# Patient Record
Sex: FEMALE | Race: WHITE | NOT HISPANIC OR LATINO
[De-identification: names, ages, dates, MRNs, and addresses within clinical notes are randomized per-mention and may not be internally consistent; named-entity substitution may affect disease eponyms.]

---

## 2018-02-26 ENCOUNTER — RX RENEWAL (OUTPATIENT)
Age: 68
End: 2018-02-26

## 2018-03-02 ENCOUNTER — RX RENEWAL (OUTPATIENT)
Age: 68
End: 2018-03-02

## 2018-05-07 ENCOUNTER — RECORD ABSTRACTING (OUTPATIENT)
Age: 68
End: 2018-05-07

## 2018-05-07 DIAGNOSIS — Z83.71 FAMILY HISTORY OF COLONIC POLYPS: ICD-10-CM

## 2018-05-07 DIAGNOSIS — Z83.511 FAMILY HISTORY OF GLAUCOMA: ICD-10-CM

## 2018-05-07 DIAGNOSIS — Z80.0 FAMILY HISTORY OF MALIGNANT NEOPLASM OF DIGESTIVE ORGANS: ICD-10-CM

## 2018-05-07 DIAGNOSIS — Z82.49 FAMILY HISTORY OF ISCHEMIC HEART DISEASE AND OTHER DISEASES OF THE CIRCULATORY SYSTEM: ICD-10-CM

## 2018-05-07 DIAGNOSIS — Z87.891 PERSONAL HISTORY OF NICOTINE DEPENDENCE: ICD-10-CM

## 2018-05-10 ENCOUNTER — APPOINTMENT (OUTPATIENT)
Dept: FAMILY MEDICINE | Facility: CLINIC | Age: 68
End: 2018-05-10
Payer: MEDICARE

## 2018-05-10 VITALS
DIASTOLIC BLOOD PRESSURE: 84 MMHG | OXYGEN SATURATION: 97 % | BODY MASS INDEX: 30 KG/M2 | HEIGHT: 62 IN | WEIGHT: 163 LBS | SYSTOLIC BLOOD PRESSURE: 132 MMHG | HEART RATE: 86 BPM | RESPIRATION RATE: 14 BRPM

## 2018-05-10 PROCEDURE — G0402 INITIAL PREVENTIVE EXAM: CPT

## 2018-05-10 PROCEDURE — G0438: CPT

## 2018-05-11 ENCOUNTER — RX RENEWAL (OUTPATIENT)
Age: 68
End: 2018-05-11

## 2018-05-23 ENCOUNTER — APPOINTMENT (OUTPATIENT)
Dept: FAMILY MEDICINE | Facility: CLINIC | Age: 68
End: 2018-05-23
Payer: MEDICARE

## 2018-05-23 PROCEDURE — 36415 COLL VENOUS BLD VENIPUNCTURE: CPT

## 2018-05-24 ENCOUNTER — RESULT REVIEW (OUTPATIENT)
Age: 68
End: 2018-05-24

## 2018-05-24 LAB
ALBUMIN SERPL ELPH-MCNC: 4.3 G/DL
ALP BLD-CCNC: 53 U/L
ALT SERPL-CCNC: 18 U/L
ANION GAP SERPL CALC-SCNC: 13 MMOL/L
AST SERPL-CCNC: 21 U/L
BASOPHILS # BLD AUTO: 0.05 K/UL
BASOPHILS NFR BLD AUTO: 0.8 %
BILIRUB SERPL-MCNC: 0.5 MG/DL
BUN SERPL-MCNC: 20 MG/DL
CALCIUM SERPL-MCNC: 9.8 MG/DL
CHLORIDE SERPL-SCNC: 107 MMOL/L
CHOLEST SERPL-MCNC: 201 MG/DL
CHOLEST/HDLC SERPL: 3.2 RATIO
CO2 SERPL-SCNC: 26 MMOL/L
CREAT SERPL-MCNC: 0.87 MG/DL
EOSINOPHIL # BLD AUTO: 0.16 K/UL
EOSINOPHIL NFR BLD AUTO: 2.6 %
GLUCOSE SERPL-MCNC: 104 MG/DL
HCT VFR BLD CALC: 43.3 %
HDLC SERPL-MCNC: 63 MG/DL
HGB BLD-MCNC: 13.5 G/DL
IMM GRANULOCYTES NFR BLD AUTO: 0.3 %
LDLC SERPL CALC-MCNC: 106 MG/DL
LYMPHOCYTES # BLD AUTO: 2.16 K/UL
LYMPHOCYTES NFR BLD AUTO: 34.6 %
MAN DIFF?: NORMAL
MCHC RBC-ENTMCNC: 29.3 PG
MCHC RBC-ENTMCNC: 31.2 GM/DL
MCV RBC AUTO: 93.9 FL
MONOCYTES # BLD AUTO: 0.45 K/UL
MONOCYTES NFR BLD AUTO: 7.2 %
NEUTROPHILS # BLD AUTO: 3.4 K/UL
NEUTROPHILS NFR BLD AUTO: 54.5 %
PLATELET # BLD AUTO: 174 K/UL
POTASSIUM SERPL-SCNC: 4.7 MMOL/L
PROT SERPL-MCNC: 7.4 G/DL
RBC # BLD: 4.61 M/UL
RBC # FLD: 14.6 %
SODIUM SERPL-SCNC: 146 MMOL/L
TRIGL SERPL-MCNC: 161 MG/DL
TSH SERPL-ACNC: 4.08 UIU/ML
WBC # FLD AUTO: 6.24 K/UL

## 2018-05-26 ENCOUNTER — TRANSCRIPTION ENCOUNTER (OUTPATIENT)
Age: 68
End: 2018-05-26

## 2018-07-27 ENCOUNTER — RESULT REVIEW (OUTPATIENT)
Age: 68
End: 2018-07-27

## 2018-08-23 ENCOUNTER — APPOINTMENT (OUTPATIENT)
Dept: FAMILY MEDICINE | Facility: CLINIC | Age: 68
End: 2018-08-23
Payer: MEDICARE

## 2018-08-23 VITALS
BODY MASS INDEX: 30.18 KG/M2 | HEART RATE: 67 BPM | OXYGEN SATURATION: 97 % | RESPIRATION RATE: 12 BRPM | SYSTOLIC BLOOD PRESSURE: 122 MMHG | WEIGHT: 165 LBS | DIASTOLIC BLOOD PRESSURE: 80 MMHG

## 2018-08-23 DIAGNOSIS — Z87.898 PERSONAL HISTORY OF OTHER SPECIFIED CONDITIONS: ICD-10-CM

## 2018-08-23 PROCEDURE — G0439: CPT

## 2018-08-23 PROCEDURE — G0101: CPT

## 2018-08-23 NOTE — ASSESSMENT
[FreeTextEntry1] : PAP sent, she will be call with results as soon as they are available and follow up next year for a wellness visit or sooner prn

## 2018-08-23 NOTE — PHYSICAL EXAM
[No Acute Distress] : no acute distress [Well Nourished] : well nourished [Well Developed] : well developed [Well-Appearing] : well-appearing [Normal Voice/Communication] : normal voice/communication [Normal Appearance] : normal in appearance [No Masses] : no palpable masses [No Nipple Discharge] : no nipple discharge [Urethral Meatus] : normal urethra [Urinary Bladder Findings] : the bladder was normal on palpation [External Female Genitalia] : normal external genitalia [Vagina] : normal vaginal exam [Cervix] : normal cervix [Uterus] : uterus was normal size, without masses or tenderness [Uterine Adnexae] : normal adnexa [Speech Grossly Normal] : speech grossly normal [Memory Grossly Normal] : memory grossly normal [Normal Mood] : the mood was normal [Normal Insight/Judgement] : insight and judgment were intact

## 2018-08-27 LAB — CYTOLOGY CVX/VAG DOC THIN PREP: NORMAL

## 2019-01-31 ENCOUNTER — RX RENEWAL (OUTPATIENT)
Age: 69
End: 2019-01-31

## 2019-05-13 ENCOUNTER — TRANSCRIPTION ENCOUNTER (OUTPATIENT)
Age: 69
End: 2019-05-13

## 2019-05-13 ENCOUNTER — APPOINTMENT (OUTPATIENT)
Dept: FAMILY MEDICINE | Facility: CLINIC | Age: 69
End: 2019-05-13
Payer: MEDICARE

## 2019-05-13 VITALS
BODY MASS INDEX: 30.36 KG/M2 | HEIGHT: 62 IN | OXYGEN SATURATION: 97 % | WEIGHT: 165 LBS | SYSTOLIC BLOOD PRESSURE: 120 MMHG | HEART RATE: 80 BPM | DIASTOLIC BLOOD PRESSURE: 80 MMHG | RESPIRATION RATE: 14 BRPM

## 2019-05-13 DIAGNOSIS — E53.8 DEFICIENCY OF OTHER SPECIFIED B GROUP VITAMINS: ICD-10-CM

## 2019-05-13 DIAGNOSIS — Z86.39 PERSONAL HISTORY OF OTHER ENDOCRINE, NUTRITIONAL AND METABOLIC DISEASE: ICD-10-CM

## 2019-05-13 DIAGNOSIS — M85.80 OTHER SPECIFIED DISORDERS OF BONE DENSITY AND STRUCTURE, UNSPECIFIED SITE: ICD-10-CM

## 2019-05-13 DIAGNOSIS — Z12.31 ENCOUNTER FOR SCREENING MAMMOGRAM FOR MALIGNANT NEOPLASM OF BREAST: ICD-10-CM

## 2019-05-13 DIAGNOSIS — M81.0 AGE-RELATED OSTEOPOROSIS W/OUT CURRENT PATHOLOGICAL FRACTURE: ICD-10-CM

## 2019-05-13 DIAGNOSIS — E55.9 VITAMIN D DEFICIENCY, UNSPECIFIED: ICD-10-CM

## 2019-05-13 DIAGNOSIS — Z87.76 PERSONAL HISTORY OF (CORRECTED) CONGENITAL MALFORMATIONS OF INTEGUMENT, LIMBS AND MUSCULOSKELETAL SYSTEM: ICD-10-CM

## 2019-05-13 PROCEDURE — 36415 COLL VENOUS BLD VENIPUNCTURE: CPT

## 2019-05-13 PROCEDURE — G0439: CPT

## 2019-05-13 NOTE — PHYSICAL EXAM
[No Acute Distress] : no acute distress [Well Developed] : well developed [Well Nourished] : well nourished [Normal Sclera/Conjunctiva] : normal sclera/conjunctiva [Well-Appearing] : well-appearing [EOMI] : extraocular movements intact [Normal Outer Ear/Nose] : the outer ears and nose were normal in appearance [PERRL] : pupils equal round and reactive to light [Normal Oropharynx] : the oropharynx was normal [Supple] : supple [Thyroid Normal, No Nodules] : the thyroid was normal and there were no nodules present [No Lymphadenopathy] : no lymphadenopathy [No Respiratory Distress] : no respiratory distress  [Normal Rate] : normal rate  [Clear to Auscultation] : lungs were clear to auscultation bilaterally [No Accessory Muscle Use] : no accessory muscle use [Regular Rhythm] : with a regular rhythm [Normal S1, S2] : normal S1 and S2 [No Murmur] : no murmur heard [No Carotid Bruits] : no carotid bruits [No Abdominal Bruit] : a ~M bruit was not heard ~T in the abdomen [No Varicosities] : no varicosities [No Extremity Clubbing/Cyanosis] : no extremity clubbing/cyanosis [Pedal Pulses Present] : the pedal pulses are present [No Edema] : there was no peripheral edema [No Palpable Aorta] : no palpable aorta [Soft] : abdomen soft [Non Tender] : non-tender [No Masses] : no abdominal mass palpated [Non-distended] : non-distended [No HSM] : no HSM [Normal Bowel Sounds] : normal bowel sounds [Normal Posterior Cervical Nodes] : no posterior cervical lymphadenopathy [No CVA Tenderness] : no CVA  tenderness [Normal Anterior Cervical Nodes] : no anterior cervical lymphadenopathy [No Spinal Tenderness] : no spinal tenderness [No Rash] : no rash [Grossly Normal Strength/Tone] : grossly normal strength/tone [No Joint Swelling] : no joint swelling [Coordination Grossly Intact] : coordination grossly intact [Normal Gait] : normal gait [No Focal Deficits] : no focal deficits [Speech Grossly Normal] : speech grossly normal [Normal Affect] : the affect was normal [Memory Grossly Normal] : memory grossly normal [Normal Mood] : the mood was normal [Alert and Oriented x3] : oriented to person, place, and time [Normal Insight/Judgement] : insight and judgment were intact [Normal TMs] : both tympanic membranes were normal [Normal Nasal Mucosa] : the nasal mucosa was normal

## 2019-05-13 NOTE — PAST MEDICAL HISTORY
[Postmenopausal] : postmenopausal [Menarche Age ____] : age at menarche was [unfilled] [Menopause Age____] : age at menopause was [unfilled] [Total Preg ___] : G[unfilled] [Live Births ___] : P[unfilled]  [Full Term ___] : Full Term: [unfilled]

## 2019-05-13 NOTE — HEALTH RISK ASSESSMENT
[Excellent] : ~his/her~  mood as  excellent [No falls in past year] : Patient reported no falls in the past year [None] : None [Retired] : retired [Alone] : lives alone [College] : College [Significant Other] : lives with significant other [Sexually Active] : sexually active [Fully functional (bathing, dressing, toileting, transferring, walking, feeding)] : Fully functional (bathing, dressing, toileting, transferring, walking, feeding) [Feels Safe at Home] : Feels safe at home [Fully functional (using the telephone, shopping, preparing meals, housekeeping, doing laundry, using] : Fully functional and needs no help or supervision to perform IADLs (using the telephone, shopping, preparing meals, housekeeping, doing laundry, using transportation, managing medications and managing finances) [Reports normal functional visual acuity (ie: able to read med bottle)] : Reports normal functional visual acuity [Smoke Detector] : smoke detector [Carbon Monoxide Detector] : carbon monoxide detector [Safety elements used in home] : safety elements used in home [Seat Belt] :  uses seat belt [Sunscreen] : uses sunscreen [Patient/Caregiver not ready to engage] : Patient/Caregiver not ready to engage [HIV test declined] : HIV test declined [Hepatitis C test declined] : Hepatitis C test declined [] : No [de-identified] : none  [de-identified] : none  [de-identified] : age 15-early forties 1ppd  [YearQuit] : 1989 [de-identified] : drink wine with dinner one-two  [de-identified] : [facility] fitness class twice weekly [de-identified] : regular diet   [Change in mental status noted] : No change in mental status noted [Language] : denies difficulty with language [Learning/Retaining New Information] : denies difficulty learning/retaining new information [Behavior] : denies difficulty with behavior [Handling Complex Tasks] : denies difficulty handling complex tasks [Reasoning] : denies difficulty with reasoning [Spatial Ability and Orientation] : denies difficulty with spatial ability and orientation [Reports changes in hearing] : Reports no changes in hearing [Reports changes in vision] : Reports no changes in vision [Reports changes in dental health] : Reports no changes in dental health [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

## 2019-05-13 NOTE — HISTORY OF PRESENT ILLNESS
[FreeTextEntry1] : Pt presents for medicare wellness [de-identified] : Patient reports today for medicare wellness. Patient reports feeling well today, no acute complaints. Denies chest pain, shortness of breath, palpitations, headaches, edema, dizziness, bowel or bladder changes.\par

## 2019-05-13 NOTE — ASSESSMENT
[FreeTextEntry1] : Check labs drawn in office today for above assessed conditions. Labs to be resulted at the [facility] core lab. \par Script given for upcoming mammo, dexa in july \par Vitals and exam stable.

## 2019-05-13 NOTE — COUNSELING
[Weight management counseling provided] : Weight management [Healthy eating counseling provided] : healthy eating [Activity counseling provided] : activity [Low Fat Diet] : Low fat diet

## 2019-05-15 LAB
25(OH)D3 SERPL-MCNC: 31.1 NG/ML
ALBUMIN SERPL ELPH-MCNC: 4.4 G/DL
ALP BLD-CCNC: 64 U/L
ALT SERPL-CCNC: 13 U/L
ANION GAP SERPL CALC-SCNC: 13 MMOL/L
AST SERPL-CCNC: 13 U/L
BASOPHILS # BLD AUTO: 0.05 K/UL
BASOPHILS NFR BLD AUTO: 0.8 %
BILIRUB SERPL-MCNC: 0.4 MG/DL
BUN SERPL-MCNC: 19 MG/DL
CALCIUM SERPL-MCNC: 9.7 MG/DL
CHLORIDE SERPL-SCNC: 106 MMOL/L
CHOLEST SERPL-MCNC: 189 MG/DL
CHOLEST/HDLC SERPL: 3.1 RATIO
CO2 SERPL-SCNC: 26 MMOL/L
CREAT SERPL-MCNC: 0.76 MG/DL
EOSINOPHIL # BLD AUTO: 0.12 K/UL
EOSINOPHIL NFR BLD AUTO: 1.8 %
GLUCOSE SERPL-MCNC: 100 MG/DL
HCT VFR BLD CALC: 44.1 %
HDLC SERPL-MCNC: 62 MG/DL
HGB BLD-MCNC: 13.7 G/DL
IMM GRANULOCYTES NFR BLD AUTO: 0.3 %
LDLC SERPL CALC-MCNC: 97 MG/DL
LYMPHOCYTES # BLD AUTO: 1.83 K/UL
LYMPHOCYTES NFR BLD AUTO: 27.7 %
MAN DIFF?: NORMAL
MCHC RBC-ENTMCNC: 29.7 PG
MCHC RBC-ENTMCNC: 31.1 GM/DL
MCV RBC AUTO: 95.7 FL
MONOCYTES # BLD AUTO: 0.51 K/UL
MONOCYTES NFR BLD AUTO: 7.7 %
NEUTROPHILS # BLD AUTO: 4.07 K/UL
NEUTROPHILS NFR BLD AUTO: 61.7 %
PLATELET # BLD AUTO: 123 K/UL
POTASSIUM SERPL-SCNC: 4.3 MMOL/L
PROT SERPL-MCNC: 7.5 G/DL
RBC # BLD: 4.61 M/UL
RBC # FLD: 13.8 %
SODIUM SERPL-SCNC: 144 MMOL/L
TRIGL SERPL-MCNC: 152 MG/DL
VIT B12 SERPL-MCNC: 377 PG/ML
WBC # FLD AUTO: 6.6 K/UL

## 2020-01-24 ENCOUNTER — RX RENEWAL (OUTPATIENT)
Age: 70
End: 2020-01-24

## 2020-05-18 ENCOUNTER — LABORATORY RESULT (OUTPATIENT)
Age: 70
End: 2020-05-18

## 2020-05-18 ENCOUNTER — APPOINTMENT (OUTPATIENT)
Dept: FAMILY MEDICINE | Facility: CLINIC | Age: 70
End: 2020-05-18
Payer: MEDICARE

## 2020-05-18 VITALS
SYSTOLIC BLOOD PRESSURE: 120 MMHG | WEIGHT: 165 LBS | OXYGEN SATURATION: 97 % | HEART RATE: 73 BPM | BODY MASS INDEX: 30.36 KG/M2 | HEIGHT: 62 IN | RESPIRATION RATE: 14 BRPM | DIASTOLIC BLOOD PRESSURE: 76 MMHG

## 2020-05-18 DIAGNOSIS — R92.2 INCONCLUSIVE MAMMOGRAM: ICD-10-CM

## 2020-05-18 PROCEDURE — G0439: CPT

## 2020-05-18 PROCEDURE — 36415 COLL VENOUS BLD VENIPUNCTURE: CPT

## 2020-05-18 NOTE — HEALTH RISK ASSESSMENT
[Very Good] : ~his/her~ current health as very good [Good] : ~his/her~  mood as  good [Intercurrent Urgi Care visits] : went to urgent care [No falls in past year] : Patient reported no falls in the past year [HIV test declined] : HIV test declined [Hepatitis C test declined] : Hepatitis C test declined [None] : None [College] : College [Retired] : retired [Alone] : lives alone [Fully functional (bathing, dressing, toileting, transferring, walking, feeding)] : Fully functional (bathing, dressing, toileting, transferring, walking, feeding) [Feels Safe at Home] : Feels safe at home [Reports changes in hearing] : Reports changes in hearing [Fully functional (using the telephone, shopping, preparing meals, housekeeping, doing laundry, using] : Fully functional and needs no help or supervision to perform IADLs (using the telephone, shopping, preparing meals, housekeeping, doing laundry, using transportation, managing medications and managing finances) [Reports normal functional visual acuity (ie: able to read med bottle)] : Reports normal functional visual acuity [Smoke Detector] : smoke detector [Carbon Monoxide Detector] : carbon monoxide detector [Seat Belt] :  uses seat belt [Safety elements used in home] : safety elements used in home [Sunscreen] : uses sunscreen [Patient/Caregiver not ready to engage] : Patient/Caregiver not ready to engage [FreeTextEntry1] : gets dehydrated with exercise [] : No [de-identified] : went for antibody testing (neg) [de-identified] : Dr Christina (Derm) [YearQuit] : 1990 [de-identified] : see SBIRT [de-identified] : exercises regularly [de-identified] : regular [Change in mental status noted] : No change in mental status noted [Behavior] : denies difficulty with behavior [Language] : denies difficulty with language [Learning/Retaining New Information] : denies difficulty learning/retaining new information [Reasoning] : denies difficulty with reasoning [Handling Complex Tasks] : denies difficulty handling complex tasks [Spatial Ability and Orientation] : denies difficulty with spatial ability and orientation [Sexually Active] : not sexually active [Reports changes in vision] : Reports no changes in vision [Reports changes in dental health] : Reports no changes in dental health [Guns at Home] : no guns at home [Travel to Developing Areas] : does not  travel to developing areas [TB Exposure] : is not being exposed to tuberculosis [Caregiver Concerns] : does not have caregiver concerns [MammogramDate] : 08/19 [PapSmearDate] : 08/18 [BoneDensityDate] : 08/19 [ColonoscopyDate] : 05/18 [de-identified] : found to have hearing loss [AdvancecareDate] : 05/20

## 2020-05-18 NOTE — PLAN
[FreeTextEntry1] : she declined receiving pneumovax, she was advised to stay well hydrated, labs will be drawn in the office today to further assess her health and symptoms, she will follow up in august for a PAP and pelvic exam and was given a rx for a screening mammogram and breast ultrasound

## 2020-05-18 NOTE — HISTORY OF PRESENT ILLNESS
[de-identified] : She is overall doing well, she has been walking for exercise and sweating and when she gets home her urine is very dark, if she drinks water it returns to a normal color

## 2020-05-18 NOTE — PHYSICAL EXAM
[No Acute Distress] : no acute distress [Well Nourished] : well nourished [Well-Appearing] : well-appearing [Well Developed] : well developed [Normal Outer Ear/Nose] : the outer ears and nose were normal in appearance [Normal Voice/Communication] : normal voice/communication [Normal Sclera/Conjunctiva] : normal sclera/conjunctiva [Normal Oropharynx] : the oropharynx was normal [Normal TMs] : both tympanic membranes were normal [No Respiratory Distress] : no respiratory distress  [Supple] : supple [No Lymphadenopathy] : no lymphadenopathy [Clear to Auscultation] : lungs were clear to auscultation bilaterally [No Accessory Muscle Use] : no accessory muscle use [Normal Rate] : normal rate  [Regular Rhythm] : with a regular rhythm [Normal S1, S2] : normal S1 and S2 [No Carotid Bruits] : no carotid bruits [Soft] : abdomen soft [No Edema] : there was no peripheral edema [Non-distended] : non-distended [Non Tender] : non-tender [Normal Bowel Sounds] : normal bowel sounds [No HSM] : no HSM [No Rash] : no rash [Coordination Grossly Intact] : coordination grossly intact [Speech Grossly Normal] : speech grossly normal [No Focal Deficits] : no focal deficits [Normal Affect] : the affect was normal [Memory Grossly Normal] : memory grossly normal [Normal Insight/Judgement] : insight and judgment were intact [Normal Mood] : the mood was normal

## 2020-05-19 DIAGNOSIS — E03.9 HYPOTHYROIDISM, UNSPECIFIED: ICD-10-CM

## 2020-05-19 LAB
25(OH)D3 SERPL-MCNC: 41.2 NG/ML
ALBUMIN SERPL ELPH-MCNC: 4.4 G/DL
ALP BLD-CCNC: 56 U/L
ALT SERPL-CCNC: 17 U/L
ANION GAP SERPL CALC-SCNC: 13 MMOL/L
AST SERPL-CCNC: 16 U/L
BASOPHILS # BLD AUTO: 0.05 K/UL
BASOPHILS NFR BLD AUTO: 0.8 %
BILIRUB SERPL-MCNC: 0.4 MG/DL
BUN SERPL-MCNC: 21 MG/DL
CALCIUM SERPL-MCNC: 9.3 MG/DL
CHLORIDE SERPL-SCNC: 106 MMOL/L
CHOLEST SERPL-MCNC: 197 MG/DL
CHOLEST/HDLC SERPL: 3 RATIO
CO2 SERPL-SCNC: 24 MMOL/L
CREAT SERPL-MCNC: 0.86 MG/DL
EOSINOPHIL # BLD AUTO: 0.13 K/UL
EOSINOPHIL NFR BLD AUTO: 2.2 %
FOLATE SERPL-MCNC: 8.1 NG/ML
GLUCOSE SERPL-MCNC: 116 MG/DL
HCT VFR BLD CALC: 42.9 %
HDLC SERPL-MCNC: 65 MG/DL
HGB BLD-MCNC: 13.1 G/DL
IMM GRANULOCYTES NFR BLD AUTO: 0.3 %
LDLC SERPL CALC-MCNC: 96 MG/DL
LYMPHOCYTES # BLD AUTO: 1.68 K/UL
LYMPHOCYTES NFR BLD AUTO: 28 %
MAN DIFF?: NORMAL
MCHC RBC-ENTMCNC: 29.4 PG
MCHC RBC-ENTMCNC: 30.5 GM/DL
MCV RBC AUTO: 96.2 FL
MONOCYTES # BLD AUTO: 0.45 K/UL
MONOCYTES NFR BLD AUTO: 7.5 %
NEUTROPHILS # BLD AUTO: 3.67 K/UL
NEUTROPHILS NFR BLD AUTO: 61.2 %
PLATELET # BLD AUTO: 147 K/UL
POTASSIUM SERPL-SCNC: 4.4 MMOL/L
PROT SERPL-MCNC: 7.1 G/DL
RBC # BLD: 4.46 M/UL
RBC # FLD: 14.2 %
SODIUM SERPL-SCNC: 143 MMOL/L
TRIGL SERPL-MCNC: 180 MG/DL
TSH SERPL-ACNC: 5.92 UIU/ML
VIT B12 SERPL-MCNC: 645 PG/ML
WBC # FLD AUTO: 6 K/UL

## 2020-05-22 ENCOUNTER — TRANSCRIPTION ENCOUNTER (OUTPATIENT)
Age: 70
End: 2020-05-22

## 2020-05-22 ENCOUNTER — APPOINTMENT (OUTPATIENT)
Dept: FAMILY MEDICINE | Facility: CLINIC | Age: 70
End: 2020-05-22
Payer: MEDICARE

## 2020-05-22 PROCEDURE — 99441: CPT | Mod: 95

## 2020-08-24 ENCOUNTER — APPOINTMENT (OUTPATIENT)
Dept: FAMILY MEDICINE | Facility: CLINIC | Age: 70
End: 2020-08-24
Payer: MEDICARE

## 2020-08-24 ENCOUNTER — LABORATORY RESULT (OUTPATIENT)
Age: 70
End: 2020-08-24

## 2020-08-24 VITALS — TEMPERATURE: 98.5 F

## 2020-08-24 VITALS
HEART RATE: 75 BPM | SYSTOLIC BLOOD PRESSURE: 140 MMHG | OXYGEN SATURATION: 96 % | WEIGHT: 170 LBS | DIASTOLIC BLOOD PRESSURE: 70 MMHG | BODY MASS INDEX: 31.28 KG/M2 | HEIGHT: 62 IN | RESPIRATION RATE: 15 BRPM

## 2020-08-24 VITALS — SYSTOLIC BLOOD PRESSURE: 136 MMHG | DIASTOLIC BLOOD PRESSURE: 74 MMHG

## 2020-08-24 DIAGNOSIS — Z01.419 ENCOUNTER FOR GYNECOLOGICAL EXAMINATION (GENERAL) (ROUTINE) W/OUT ABNORMAL FINDINGS: ICD-10-CM

## 2020-08-24 DIAGNOSIS — Z00.00 ENCOUNTER FOR GENERAL ADULT MEDICAL EXAMINATION W/OUT ABNORMAL FINDINGS: ICD-10-CM

## 2020-08-24 PROCEDURE — 99213 OFFICE O/P EST LOW 20 MIN: CPT | Mod: 25

## 2020-08-24 PROCEDURE — G0439: CPT

## 2020-08-24 PROCEDURE — G0101: CPT

## 2020-08-24 PROCEDURE — 36415 COLL VENOUS BLD VENIPUNCTURE: CPT

## 2020-08-24 NOTE — PLAN
[FreeTextEntry1] : PAP was obtained today, she was advised to follow a low sodium diet and to check her BP periodically at the pharmacy and to follow up if it is elevated, labs will be drawn in the office today to follow up on the hypothyroidism and to be screened for Hepatitis C

## 2020-08-24 NOTE — HISTORY OF PRESENT ILLNESS
[FreeTextEntry1] : pt. presents for PAP  [de-identified] : Her LMP was at age 50.  She is scheduled for the mammogram and breast ultrasound.  She ate salty potato chips last night

## 2020-08-24 NOTE — PHYSICAL EXAM
[No Acute Distress] : no acute distress [Well Developed] : well developed [Well Nourished] : well nourished [Normal Voice/Communication] : normal voice/communication [Well-Appearing] : well-appearing [No Masses] : no palpable masses [Normal Appearance] : normal in appearance [No Nipple Discharge] : no nipple discharge [Urethral Meatus] : normal urethra [Urinary Bladder Findings] : the bladder was normal on palpation [Vagina] : normal vaginal exam [External Female Genitalia] : normal external genitalia [Cervix] : normal cervix [Uterus] : uterus was normal size, without masses or tenderness [Coordination Grossly Intact] : coordination grossly intact [Anus Abnormality] : the anus and perineum were normal [Uterine Adnexae] : normal adnexa [Speech Grossly Normal] : speech grossly normal [Memory Grossly Normal] : memory grossly normal [Normal Mood] : the mood was normal [Normal Insight/Judgement] : insight and judgment were intact

## 2020-08-25 LAB
HCV AB SER QL: NONREACTIVE
HCV S/CO RATIO: 0.1 S/CO
T3 SERPL-MCNC: 132 NG/DL
T3FREE SERPL-MCNC: 3 PG/ML
T3RU NFR SERPL: 1.3 TBI
T4 FREE SERPL-MCNC: 1.1 NG/DL
T4 SERPL-MCNC: 7.8 UG/DL
TSH SERPL-ACNC: 3.47 UIU/ML

## 2020-08-28 LAB — CYTOLOGY CVX/VAG DOC THIN PREP: NORMAL

## 2020-10-22 PROBLEM — Z00.00 MEDICARE ANNUAL WELLNESS VISIT, SUBSEQUENT: Status: RESOLVED | Noted: 2019-05-13 | Resolved: 2020-10-22

## 2020-12-23 PROBLEM — Z01.419 ENCOUNTER FOR GYNECOLOGICAL EXAMINATION WITHOUT ABNORMAL FINDING: Status: RESOLVED | Noted: 2018-08-23 | Resolved: 2020-12-23

## 2021-01-21 ENCOUNTER — RX RENEWAL (OUTPATIENT)
Age: 71
End: 2021-01-21

## 2021-02-23 NOTE — HISTORY OF PRESENT ILLNESS
[FreeTextEntry1] : pt presents for pap exam [de-identified] : Her LMP was at age 49.  She denies any history of abnormal vaginal bleeding or cramping. hard copy, drawn during this pregnancy

## 2021-05-12 ENCOUNTER — NON-APPOINTMENT (OUTPATIENT)
Age: 71
End: 2021-05-12

## 2021-05-24 ENCOUNTER — NON-APPOINTMENT (OUTPATIENT)
Age: 71
End: 2021-05-24

## 2021-05-24 ENCOUNTER — APPOINTMENT (OUTPATIENT)
Dept: FAMILY MEDICINE | Facility: CLINIC | Age: 71
End: 2021-05-24
Payer: MEDICARE

## 2021-05-24 ENCOUNTER — RESULT REVIEW (OUTPATIENT)
Age: 71
End: 2021-05-24

## 2021-05-24 VITALS
RESPIRATION RATE: 14 BRPM | HEIGHT: 62 IN | BODY MASS INDEX: 30.36 KG/M2 | HEART RATE: 68 BPM | SYSTOLIC BLOOD PRESSURE: 126 MMHG | DIASTOLIC BLOOD PRESSURE: 82 MMHG | WEIGHT: 165 LBS | OXYGEN SATURATION: 98 %

## 2021-05-24 VITALS — TEMPERATURE: 98.2 F

## 2021-05-24 DIAGNOSIS — Z87.898 PERSONAL HISTORY OF OTHER SPECIFIED CONDITIONS: ICD-10-CM

## 2021-05-24 DIAGNOSIS — Z79.899 OTHER LONG TERM (CURRENT) DRUG THERAPY: ICD-10-CM

## 2021-05-24 DIAGNOSIS — R69 ILLNESS, UNSPECIFIED: ICD-10-CM

## 2021-05-24 DIAGNOSIS — Z11.59 ENCOUNTER FOR SCREENING FOR OTHER VIRAL DISEASES: ICD-10-CM

## 2021-05-24 DIAGNOSIS — L20.9 ATOPIC DERMATITIS, UNSPECIFIED: ICD-10-CM

## 2021-05-24 LAB
BILIRUB UR QL STRIP: NORMAL
CLARITY UR: NORMAL
COLLECTION METHOD: NORMAL
GLUCOSE UR-MCNC: NORMAL
HCG UR QL: 0.2 EU/DL
HGB UR QL STRIP.AUTO: NORMAL
KETONES UR-MCNC: NORMAL
LEUKOCYTE ESTERASE UR QL STRIP: NORMAL
NITRITE UR QL STRIP: NORMAL
PH UR STRIP: 5.5
PROT UR STRIP-MCNC: 300
SP GR UR STRIP: 1.03

## 2021-05-24 PROCEDURE — 81003 URINALYSIS AUTO W/O SCOPE: CPT | Mod: QW

## 2021-05-24 PROCEDURE — 99213 OFFICE O/P EST LOW 20 MIN: CPT | Mod: 25

## 2021-05-24 PROCEDURE — G0439: CPT

## 2021-05-24 PROCEDURE — 36415 COLL VENOUS BLD VENIPUNCTURE: CPT

## 2021-05-24 RX ORDER — TRIAMCINOLONE ACETONIDE 1 MG/G
0.1 OINTMENT TOPICAL
Qty: 454 | Refills: 0 | Status: DISCONTINUED | COMMUNITY
Start: 2021-03-01

## 2021-05-24 NOTE — HISTORY OF PRESENT ILLNESS
[de-identified] : She had lab testing for life insurance last week and the urine test showed blood and protein.  She notices that sometimes after exercise her urine looks dark.  During her mother's recent  she had pain in the left side of her abdomen.  She took Gas X and then vomited and the pain resolved.  She had seen blood in her urine 2 days prior.

## 2021-05-24 NOTE — COUNSELING
[AUDIT-C Screening administered and reviewed] : AUDIT-C Screening administered and reviewed [Hazards of at-risk alcohol use discussed] : Hazards of at-risk alcohol use discussed [FreeTextEntry2] : drinks wine with dinner, advised decreased use

## 2021-05-24 NOTE — PLAN
[FreeTextEntry1] : renal ultrasound and POCT U/A were ordered to further evaluate the abnormal urine findings and symptoms, labs will be drawn in the office today to further assess her health

## 2021-05-24 NOTE — REVIEW OF SYSTEMS
[Abdominal Pain] : abdominal pain [Nausea] : nausea [Vomiting] : vomiting [Hematuria] : hematuria [Negative] : Heme/Lymph

## 2021-05-24 NOTE — PHYSICAL EXAM
[No Acute Distress] : no acute distress [Well Nourished] : well nourished [Well Developed] : well developed [Well-Appearing] : well-appearing [Normal Voice/Communication] : normal voice/communication [Normal Sclera/Conjunctiva] : normal sclera/conjunctiva [Normal Outer Ear/Nose] : the outer ears and nose were normal in appearance [Normal TMs] : both tympanic membranes were normal [No Lymphadenopathy] : no lymphadenopathy [Supple] : supple [No Respiratory Distress] : no respiratory distress  [No Accessory Muscle Use] : no accessory muscle use [Clear to Auscultation] : lungs were clear to auscultation bilaterally [Normal Rate] : normal rate  [Regular Rhythm] : with a regular rhythm [Normal S1, S2] : normal S1 and S2 [No Murmur] : no murmur heard [No Carotid Bruits] : no carotid bruits [No Edema] : there was no peripheral edema [Soft] : abdomen soft [Non Tender] : non-tender [Non-distended] : non-distended [No CVA Tenderness] : no CVA  tenderness [Coordination Grossly Intact] : coordination grossly intact [Normal Mood] : the mood was normal

## 2021-05-24 NOTE — HEALTH RISK ASSESSMENT
[Very Good] : ~his/her~  mood as very good [No] : In the past 12 months have you used drugs other than those required for medical reasons? No [No falls in past year] : Patient reported no falls in the past year [HIV test declined] : HIV test declined [Hepatitis C test declined] : Hepatitis C test declined [None] : None [Alone] : lives alone [Feels Safe at Home] : Feels safe at home [Fully functional (bathing, dressing, toileting, transferring, walking, feeding)] : Fully functional (bathing, dressing, toileting, transferring, walking, feeding) [Fully functional (using the telephone, shopping, preparing meals, housekeeping, doing laundry, using] : Fully functional and needs no help or supervision to perform IADLs (using the telephone, shopping, preparing meals, housekeeping, doing laundry, using transportation, managing medications and managing finances) [Reports normal functional visual acuity (ie: able to read med bottle)] : Reports normal functional visual acuity [Smoke Detector] : smoke detector [Carbon Monoxide Detector] : carbon monoxide detector [Safety elements used in home] : safety elements used in home [Seat Belt] :  uses seat belt [Sunscreen] : uses sunscreen [Designated Healthcare Proxy] : Designated healthcare proxy [Name: ___] : Health Care Proxy's Name: [unfilled]  [Relationship: ___] : Relationship: [unfilled] [DNR] : DNR [DNI] : DNI [FreeTextEntry1] : see HPI [] : No [de-identified] : none [de-identified] : Dr Cummings (Derm) [YearQuit] : 1990 [de-identified] : see SBIRT [de-identified] : tries to exercise [de-identified] : regular [Change in mental status noted] : No change in mental status noted [Language] : denies difficulty with language [Behavior] : denies difficulty with behavior [Learning/Retaining New Information] : denies difficulty learning/retaining new information [Handling Complex Tasks] : denies difficulty handling complex tasks [Reasoning] : denies difficulty with reasoning [Spatial Ability and Orientation] : denies difficulty with spatial ability and orientation [Reports changes in hearing] : Reports no changes in hearing [Reports changes in vision] : Reports no changes in vision [Reports changes in dental health] : Reports no changes in dental health [Guns at Home] : no guns at home [Travel to Developing Areas] : does not  travel to developing areas [TB Exposure] : is not being exposed to tuberculosis [Caregiver Concerns] : does not have caregiver concerns [PapSmearDate] : 08/18 [MammogramDate] : 09/20 [BoneDensityDate] : 08/19 [ColonoscopyDate] : 05/18 [AdvancecareDate] : 05/21

## 2021-05-25 ENCOUNTER — OUTPATIENT (OUTPATIENT)
Dept: OUTPATIENT SERVICES | Facility: HOSPITAL | Age: 71
LOS: 1 days | End: 2021-05-25
Payer: MEDICARE

## 2021-05-25 ENCOUNTER — APPOINTMENT (OUTPATIENT)
Dept: ULTRASOUND IMAGING | Facility: CLINIC | Age: 71
End: 2021-05-25
Payer: MEDICARE

## 2021-05-25 DIAGNOSIS — R31.0 GROSS HEMATURIA: ICD-10-CM

## 2021-05-25 PROCEDURE — 76770 US EXAM ABDO BACK WALL COMP: CPT | Mod: 26

## 2021-05-25 PROCEDURE — 76770 US EXAM ABDO BACK WALL COMP: CPT

## 2021-05-26 ENCOUNTER — APPOINTMENT (OUTPATIENT)
Dept: UROLOGY | Facility: CLINIC | Age: 71
End: 2021-05-26
Payer: MEDICARE

## 2021-05-26 VITALS
DIASTOLIC BLOOD PRESSURE: 73 MMHG | WEIGHT: 165 LBS | HEART RATE: 70 BPM | HEIGHT: 62 IN | BODY MASS INDEX: 30.36 KG/M2 | SYSTOLIC BLOOD PRESSURE: 129 MMHG

## 2021-05-26 LAB
ALBUMIN SERPL ELPH-MCNC: 4.4 G/DL
ALP BLD-CCNC: 56 U/L
ALT SERPL-CCNC: 12 U/L
ANION GAP SERPL CALC-SCNC: 11 MMOL/L
AST SERPL-CCNC: 18 U/L
BASOPHILS # BLD AUTO: 0.06 K/UL
BASOPHILS NFR BLD AUTO: 0.8 %
BILIRUB SERPL-MCNC: 0.3 MG/DL
BUN SERPL-MCNC: 19 MG/DL
CALCIUM SERPL-MCNC: 9.7 MG/DL
CHLORIDE SERPL-SCNC: 107 MMOL/L
CHOLEST SERPL-MCNC: 194 MG/DL
CO2 SERPL-SCNC: 24 MMOL/L
CREAT SERPL-MCNC: 0.82 MG/DL
EOSINOPHIL # BLD AUTO: 0.16 K/UL
EOSINOPHIL NFR BLD AUTO: 2.1 %
GLUCOSE SERPL-MCNC: 111 MG/DL
HCT VFR BLD CALC: 43.7 %
HDLC SERPL-MCNC: 73 MG/DL
HGB BLD-MCNC: 13.3 G/DL
IMM GRANULOCYTES NFR BLD AUTO: 0.3 %
LDLC SERPL CALC-MCNC: 102 MG/DL
LYMPHOCYTES # BLD AUTO: 1.88 K/UL
LYMPHOCYTES NFR BLD AUTO: 24.9 %
MAN DIFF?: NORMAL
MCHC RBC-ENTMCNC: 29.9 PG
MCHC RBC-ENTMCNC: 30.4 GM/DL
MCV RBC AUTO: 98.2 FL
MONOCYTES # BLD AUTO: 0.48 K/UL
MONOCYTES NFR BLD AUTO: 6.3 %
NEUTROPHILS # BLD AUTO: 4.96 K/UL
NEUTROPHILS NFR BLD AUTO: 65.6 %
NONHDLC SERPL-MCNC: 121 MG/DL
PLATELET # BLD AUTO: 146 K/UL
POTASSIUM SERPL-SCNC: 4.4 MMOL/L
PROT SERPL-MCNC: 7.5 G/DL
RBC # BLD: 4.45 M/UL
RBC # FLD: 14.6 %
SODIUM SERPL-SCNC: 142 MMOL/L
TRIGL SERPL-MCNC: 96 MG/DL
TSH SERPL-ACNC: 3.02 UIU/ML
WBC # FLD AUTO: 7.56 K/UL

## 2021-05-26 PROCEDURE — 99204 OFFICE O/P NEW MOD 45 MIN: CPT

## 2021-05-26 NOTE — LETTER BODY
[Dear  ___] : Dear ~KATELYNN, [Consult Letter:] : I had the pleasure of evaluating your patient, [unfilled]. [( Thank you for referring [unfilled] for consultation for _____ )] : Thank you for referring [unfilled] for consultation for [unfilled] [Please see my note below.] : Please see my note below. [Consult Closing:] : Thank you very much for allowing me to participate in the care of this patient.  If you have any questions, please do not hesitate to contact me. [Sincerely,] : Sincerely,

## 2021-05-26 NOTE — HISTORY OF PRESENT ILLNESS
Ventricular Rate : 138   Atrial Rate : 138   QRS Duration : 124   Q-T Interval : 214   QTC Calculation(Bezet) : 324   R Axis : -59   T Axis : 32   Diagnosis : Atrial fibrillation with rapid ventricular response with premature ventricular or aberrantly conducted complexes~Left axis deviation~Right bundle branch block~Inferior infarct , age undetermined~Abnormal ECG~When compared with ECG of 18-AUG-2017 21:13, (unconfirmed)~Atrial fibrillation has replaced Electronic atrial pacemaker~Vent. rate has increased BY  78 BPM~Right bundle branch block is now Present~Inferior infarct is now Present~Confirmed by Chau Brady ([id number]) on 8/20/2017 10:42:33 AM      [FreeTextEntry1] : The patient is a 70-year-old woman who was seen in the office today for the above.  She has no other urological or constitutional symptomatology.  Her daytime frequency is 6 times a day without nocturia.\par \par Past Urological History: Negative\par \par Urological Family History:\par Father-nephrolithiasis

## 2021-05-26 NOTE — ASSESSMENT
[FreeTextEntry1] : 1.)  Microscopic hematuria\par 5/24/2021 BUN 19 creatinine 0.82\par Today's dipstick urinalysis demonstrated large blood and small leukocytes so was sent to laboratory for microscopic exam and culture.\par 3 more urinalyses will be obtained and if more than 3 RBCs are noted in 2 out of the 3 tests, a hematuria work-up will be pursued.\par \par RTO 2 weeks for reevaluation.\par

## 2021-05-26 NOTE — PHYSICAL EXAM
[General Appearance - Well Developed] : well developed [General Appearance - Well Nourished] : well nourished [Normal Appearance] : normal appearance [Well Groomed] : well groomed [General Appearance - In No Acute Distress] : no acute distress [Edema] : no peripheral edema [Respiration, Rhythm And Depth] : normal respiratory rhythm and effort [Exaggerated Use Of Accessory Muscles For Inspiration] : no accessory muscle use [Auscultation Breath Sounds / Voice Sounds] : lungs were clear to auscultation bilaterally [Bowel Sounds] : normal bowel sounds [Abdomen Soft] : soft [Abdomen Tenderness] : non-tender [Abdomen Mass (___ Cm)] : no abdominal mass palpated [Costovertebral Angle Tenderness] : no ~M costovertebral angle tenderness [Normal Station and Gait] : the gait and station were normal for the patient's age [] : no rash [No Focal Deficits] : no focal deficits [Oriented To Time, Place, And Person] : oriented to person, place, and time [Affect] : the affect was normal [Mood] : the mood was normal [Not Anxious] : not anxious [No Palpable Adenopathy] : no palpable adenopathy [FreeTextEntry1] : S1-S2 normal without murmur rub or gallop.

## 2021-05-26 NOTE — REVIEW OF SYSTEMS
[see HPI] : see HPI [Feeling Tired] : feeling tired [Constipation] : constipation [Told you have blood in urine on a urine test] : told blood was present in a urine test [Wake up at night to urinate  How many times?  ___] : wakes up to urinate [unfilled] times during the night [Leakage of urine with straining, coughing, laughing] : leakage of urine with straining, coughing, laughing [Negative] : Musculoskeletal

## 2021-05-28 LAB
APPEARANCE: ABNORMAL
BACTERIA: ABNORMAL
BILIRUB UR QL STRIP: NEGATIVE
BILIRUBIN URINE: NEGATIVE
BLOOD URINE: ABNORMAL
CALCIUM OXALATE CRYSTALS: ABNORMAL
CLARITY UR: CLEAR
COLLECTION METHOD: NORMAL
COLOR: ABNORMAL
GLUCOSE QUALITATIVE U: NEGATIVE
GLUCOSE UR-MCNC: NEGATIVE
HCG UR QL: 0.2 EU/DL
HGB UR QL STRIP.AUTO: ABNORMAL
HYALINE CASTS: 0 /LPF
KETONES UR-MCNC: NEGATIVE
KETONES URINE: NEGATIVE
LEUKOCYTE ESTERASE UR QL STRIP: ABNORMAL
LEUKOCYTE ESTERASE URINE: ABNORMAL
MICROSCOPIC-UA: NORMAL
NITRITE UR QL STRIP: NEGATIVE
NITRITE URINE: NEGATIVE
PH UR STRIP: 5.5
PH URINE: 6
PROT UR STRIP-MCNC: ABNORMAL
PROTEIN URINE: ABNORMAL
RED BLOOD CELLS URINE: >720 /HPF
SP GR UR STRIP: 1.02
SPECIFIC GRAVITY URINE: 1.02
SQUAMOUS EPITHELIAL CELLS: 5 /HPF
URIC ACID CRYSTALS: ABNORMAL
UROBILINOGEN URINE: NORMAL
WHITE BLOOD CELLS URINE: 12 /HPF

## 2021-05-30 LAB — BACTERIA UR CULT: NORMAL

## 2021-06-04 LAB
APPEARANCE: ABNORMAL
BACTERIA: NEGATIVE
BILIRUBIN URINE: NEGATIVE
BLOOD URINE: ABNORMAL
COLOR: ABNORMAL
GLUCOSE QUALITATIVE U: NEGATIVE
HYALINE CASTS: 0 /LPF
KETONES URINE: NEGATIVE
LEUKOCYTE ESTERASE URINE: ABNORMAL
MICROSCOPIC-UA: NORMAL
NITRITE URINE: NEGATIVE
PH URINE: 5.5
PROTEIN URINE: ABNORMAL
RED BLOOD CELLS URINE: >720 /HPF
SPECIFIC GRAVITY URINE: 1.02
SQUAMOUS EPITHELIAL CELLS: 2 /HPF
URIC ACID CRYSTALS: ABNORMAL
URINE COMMENTS: NORMAL
UROBILINOGEN URINE: NORMAL
WHITE BLOOD CELLS URINE: 34 /HPF

## 2021-06-05 LAB
APPEARANCE: ABNORMAL
BACTERIA: NEGATIVE
BILIRUBIN URINE: NEGATIVE
BLOOD URINE: ABNORMAL
COLOR: YELLOW
GLUCOSE QUALITATIVE U: NEGATIVE
HYALINE CASTS: 6 /LPF
KETONES URINE: NEGATIVE
LEUKOCYTE ESTERASE URINE: ABNORMAL
MICROSCOPIC-UA: NORMAL
NITRITE URINE: NEGATIVE
PH URINE: 6
PROTEIN URINE: ABNORMAL
RED BLOOD CELLS URINE: >720 /HPF
SPECIFIC GRAVITY URINE: 1.02
SQUAMOUS EPITHELIAL CELLS: 6 /HPF
UROBILINOGEN URINE: NORMAL
WHITE BLOOD CELLS URINE: 46 /HPF

## 2021-06-08 ENCOUNTER — OUTPATIENT (OUTPATIENT)
Dept: OUTPATIENT SERVICES | Facility: HOSPITAL | Age: 71
LOS: 1 days | End: 2021-06-08
Payer: MEDICARE

## 2021-06-08 ENCOUNTER — APPOINTMENT (OUTPATIENT)
Dept: MRI IMAGING | Facility: CLINIC | Age: 71
End: 2021-06-08
Payer: MEDICARE

## 2021-06-08 DIAGNOSIS — K76.9 LIVER DISEASE, UNSPECIFIED: ICD-10-CM

## 2021-06-08 LAB
APPEARANCE: ABNORMAL
BACTERIA: NEGATIVE
BILIRUBIN URINE: NEGATIVE
BLOOD URINE: ABNORMAL
COLOR: YELLOW
GLUCOSE QUALITATIVE U: NEGATIVE
HYALINE CASTS: 0 /LPF
KETONES URINE: NEGATIVE
LEUKOCYTE ESTERASE URINE: ABNORMAL
MICROSCOPIC-UA: NORMAL
NITRITE URINE: NEGATIVE
PH URINE: 6
PROTEIN URINE: ABNORMAL
RED BLOOD CELLS URINE: 136 /HPF
SPECIFIC GRAVITY URINE: >=1.03
SQUAMOUS EPITHELIAL CELLS: 1 /HPF
URINE COMMENTS: NORMAL
UROBILINOGEN URINE: NORMAL
WHITE BLOOD CELLS URINE: 22 /HPF

## 2021-06-08 PROCEDURE — A9585: CPT

## 2021-06-08 PROCEDURE — 74183 MRI ABD W/O CNTR FLWD CNTR: CPT | Mod: 26,ME

## 2021-06-08 PROCEDURE — 74183 MRI ABD W/O CNTR FLWD CNTR: CPT

## 2021-06-08 PROCEDURE — G1004: CPT

## 2021-06-09 ENCOUNTER — LABORATORY RESULT (OUTPATIENT)
Age: 71
End: 2021-06-09

## 2021-06-09 ENCOUNTER — APPOINTMENT (OUTPATIENT)
Dept: UROLOGY | Facility: CLINIC | Age: 71
End: 2021-06-09
Payer: MEDICARE

## 2021-06-09 VITALS — TEMPERATURE: 97.9 F

## 2021-06-09 PROCEDURE — 99214 OFFICE O/P EST MOD 30 MIN: CPT

## 2021-06-09 NOTE — HISTORY OF PRESENT ILLNESS
[FreeTextEntry1] : The patient is a 70-year-old woman who was seen in the office today for the above.  She submitted 3 more urinalyses to confirm the presence of blood.\par \par Her past medical history, surgical history, medication history and allergy history are unchanged.

## 2021-06-09 NOTE — LETTER BODY
[Dear  ___] : Dear ~KATELYNN, [Courtesy Letter:] : I had the pleasure of seeing your patient, [unfilled], in my office today. [Please see my note below.] : Please see my note below. [Consult Closing:] : Thank you very much for allowing me to participate in the care of this patient.  If you have any questions, please do not hesitate to contact me. [Sincerely,] : Sincerely,

## 2021-06-09 NOTE — ASSESSMENT
[FreeTextEntry1] : 1.)  Blood in the urine with pain in the left side of the abdomen\par All 4 microscopic urinalyses demonstrated significant microscopic hematuria and pyuria.  Since she did not have symptoms of a UTI at presentation during the last visit, the urine culture was not obtained.\par Urine culture will be sent today.\par A BUN, creatinine, CT urogram, urine cytology, and office cystoscopy will be ordered.

## 2021-06-10 ENCOUNTER — APPOINTMENT (OUTPATIENT)
Dept: CT IMAGING | Facility: CLINIC | Age: 71
End: 2021-06-10
Payer: MEDICARE

## 2021-06-10 ENCOUNTER — OUTPATIENT (OUTPATIENT)
Dept: OUTPATIENT SERVICES | Facility: HOSPITAL | Age: 71
LOS: 1 days | End: 2021-06-10
Payer: MEDICARE

## 2021-06-10 DIAGNOSIS — R31.0 GROSS HEMATURIA: ICD-10-CM

## 2021-06-10 LAB
APPEARANCE: ABNORMAL
BACTERIA: ABNORMAL
BILIRUBIN URINE: NEGATIVE
BLOOD URINE: ABNORMAL
BUN SERPL-MCNC: 19 MG/DL
COLOR: COLORLESS
CREAT SERPL-MCNC: 0.87 MG/DL
GLUCOSE QUALITATIVE U: NEGATIVE
HYALINE CASTS: 0 /LPF
KETONES URINE: NEGATIVE
LEUKOCYTE ESTERASE URINE: ABNORMAL
MICROSCOPIC-UA: NORMAL
NITRITE URINE: NEGATIVE
PH URINE: 6
PROTEIN URINE: ABNORMAL
RED BLOOD CELLS URINE: >720 /HPF
SPECIFIC GRAVITY URINE: 1.01
SQUAMOUS EPITHELIAL CELLS: 1 /HPF
UROBILINOGEN URINE: NORMAL
WHITE BLOOD CELLS URINE: 11 /HPF

## 2021-06-10 PROCEDURE — 74178 CT ABD&PLV WO CNTR FLWD CNTR: CPT | Mod: 26,MH

## 2021-06-10 PROCEDURE — 74178 CT ABD&PLV WO CNTR FLWD CNTR: CPT

## 2021-06-10 PROCEDURE — 82565 ASSAY OF CREATININE: CPT

## 2021-06-11 LAB — BACTERIA UR CULT: NORMAL

## 2021-06-14 LAB — URINE CYTOLOGY: NORMAL

## 2021-06-17 LAB — URINE CYTOLOGY: NORMAL

## 2021-06-30 ENCOUNTER — APPOINTMENT (OUTPATIENT)
Dept: UROLOGY | Facility: CLINIC | Age: 71
End: 2021-06-30
Payer: MEDICARE

## 2021-06-30 VITALS — DIASTOLIC BLOOD PRESSURE: 79 MMHG | HEART RATE: 84 BPM | TEMPERATURE: 98.1 F | SYSTOLIC BLOOD PRESSURE: 151 MMHG

## 2021-06-30 DIAGNOSIS — R31.29 OTHER MICROSCOPIC HEMATURIA: ICD-10-CM

## 2021-06-30 PROCEDURE — 52000 CYSTOURETHROSCOPY: CPT

## 2021-07-05 LAB — NIDUS STONE QN: NORMAL

## 2021-07-12 ENCOUNTER — APPOINTMENT (OUTPATIENT)
Dept: UROLOGY | Facility: CLINIC | Age: 71
End: 2021-07-12
Payer: MEDICARE

## 2021-07-12 VITALS — TEMPERATURE: 98.1 F

## 2021-07-12 PROCEDURE — 99213 OFFICE O/P EST LOW 20 MIN: CPT

## 2021-07-12 NOTE — PHYSICAL EXAM
[General Appearance - Well Developed] : well developed [General Appearance - Well Nourished] : well nourished [Normal Appearance] : normal appearance [Well Groomed] : well groomed [General Appearance - In No Acute Distress] : no acute distress [Bowel Sounds] : normal bowel sounds [Abdomen Soft] : soft [Abdomen Tenderness] : non-tender [Abdomen Mass (___ Cm)] : no abdominal mass palpated [Costovertebral Angle Tenderness] : no ~M costovertebral angle tenderness [Urinary Bladder Findings] : the bladder was normal on palpation [Edema] : no peripheral edema [] : no respiratory distress [Respiration, Rhythm And Depth] : normal respiratory rhythm and effort [Exaggerated Use Of Accessory Muscles For Inspiration] : no accessory muscle use [Oriented To Time, Place, And Person] : oriented to person, place, and time [Affect] : the affect was normal [Mood] : the mood was normal [Not Anxious] : not anxious [Normal Station and Gait] : the gait and station were normal for the patient's age [No Focal Deficits] : no focal deficits

## 2021-07-12 NOTE — HISTORY OF PRESENT ILLNESS
[FreeTextEntry1] : The patient is a 70-year-old woman previously seen by Dr Moore for gross hematuria. She was found to have large stone burden on CT, cluster of stones measuring 2.5 in LEFT renal pelvis plus additional 1 cm stone in lower pole. She is seen 7/12/2021 to discuss treatment options.

## 2021-07-12 NOTE — ASSESSMENT
[FreeTextEntry1] : 71 yo F with 2.5 cm renal stones on LEFT. Recommended PCNL. RBA discussed. Pt agrees.

## 2021-07-23 ENCOUNTER — OUTPATIENT (OUTPATIENT)
Dept: OUTPATIENT SERVICES | Facility: HOSPITAL | Age: 71
LOS: 1 days | End: 2021-07-23
Payer: MEDICARE

## 2021-07-23 ENCOUNTER — RESULT REVIEW (OUTPATIENT)
Age: 71
End: 2021-07-23

## 2021-07-23 VITALS
TEMPERATURE: 98 F | DIASTOLIC BLOOD PRESSURE: 66 MMHG | SYSTOLIC BLOOD PRESSURE: 140 MMHG | HEART RATE: 60 BPM | WEIGHT: 170.2 LBS | OXYGEN SATURATION: 98 % | HEIGHT: 62 IN

## 2021-07-23 DIAGNOSIS — Z01.818 ENCOUNTER FOR OTHER PREPROCEDURAL EXAMINATION: ICD-10-CM

## 2021-07-23 DIAGNOSIS — N20.0 CALCULUS OF KIDNEY: ICD-10-CM

## 2021-07-23 DIAGNOSIS — Z98.890 OTHER SPECIFIED POSTPROCEDURAL STATES: Chronic | ICD-10-CM

## 2021-07-23 LAB
ANION GAP SERPL CALC-SCNC: 4 MMOL/L — LOW (ref 5–17)
APPEARANCE UR: ABNORMAL
APTT BLD: 28.7 SEC — SIGNIFICANT CHANGE UP (ref 27.5–35.5)
BASOPHILS # BLD AUTO: 0.04 K/UL — SIGNIFICANT CHANGE UP (ref 0–0.2)
BASOPHILS NFR BLD AUTO: 0.5 % — SIGNIFICANT CHANGE UP (ref 0–2)
BILIRUB UR-MCNC: NEGATIVE — SIGNIFICANT CHANGE UP
BUN SERPL-MCNC: 21 MG/DL — SIGNIFICANT CHANGE UP (ref 7–23)
CALCIUM SERPL-MCNC: 9.4 MG/DL — SIGNIFICANT CHANGE UP (ref 8.5–10.1)
CHLORIDE SERPL-SCNC: 110 MMOL/L — HIGH (ref 96–108)
CO2 SERPL-SCNC: 27 MMOL/L — SIGNIFICANT CHANGE UP (ref 22–31)
COLOR SPEC: YELLOW — SIGNIFICANT CHANGE UP
CREAT SERPL-MCNC: 0.86 MG/DL — SIGNIFICANT CHANGE UP (ref 0.5–1.3)
DIFF PNL FLD: ABNORMAL
EOSINOPHIL # BLD AUTO: 0.16 K/UL — SIGNIFICANT CHANGE UP (ref 0–0.5)
EOSINOPHIL NFR BLD AUTO: 2.1 % — SIGNIFICANT CHANGE UP (ref 0–6)
GLUCOSE SERPL-MCNC: 97 MG/DL — SIGNIFICANT CHANGE UP (ref 70–99)
GLUCOSE UR QL: NEGATIVE MG/DL — SIGNIFICANT CHANGE UP
HCT VFR BLD CALC: 40.9 % — SIGNIFICANT CHANGE UP (ref 34.5–45)
HGB BLD-MCNC: 13.1 G/DL — SIGNIFICANT CHANGE UP (ref 11.5–15.5)
IMM GRANULOCYTES NFR BLD AUTO: 0.3 % — SIGNIFICANT CHANGE UP (ref 0–1.5)
INR BLD: 0.96 RATIO — SIGNIFICANT CHANGE UP (ref 0.88–1.16)
KETONES UR-MCNC: NEGATIVE — SIGNIFICANT CHANGE UP
LEUKOCYTE ESTERASE UR-ACNC: ABNORMAL
LYMPHOCYTES # BLD AUTO: 1.97 K/UL — SIGNIFICANT CHANGE UP (ref 1–3.3)
LYMPHOCYTES # BLD AUTO: 26.1 % — SIGNIFICANT CHANGE UP (ref 13–44)
MCHC RBC-ENTMCNC: 29.7 PG — SIGNIFICANT CHANGE UP (ref 27–34)
MCHC RBC-ENTMCNC: 32 GM/DL — SIGNIFICANT CHANGE UP (ref 32–36)
MCV RBC AUTO: 92.7 FL — SIGNIFICANT CHANGE UP (ref 80–100)
MONOCYTES # BLD AUTO: 0.63 K/UL — SIGNIFICANT CHANGE UP (ref 0–0.9)
MONOCYTES NFR BLD AUTO: 8.3 % — SIGNIFICANT CHANGE UP (ref 2–14)
NEUTROPHILS # BLD AUTO: 4.74 K/UL — SIGNIFICANT CHANGE UP (ref 1.8–7.4)
NEUTROPHILS NFR BLD AUTO: 62.7 % — SIGNIFICANT CHANGE UP (ref 43–77)
NITRITE UR-MCNC: NEGATIVE — SIGNIFICANT CHANGE UP
PH UR: 5 — SIGNIFICANT CHANGE UP (ref 5–8)
PLATELET # BLD AUTO: 226 K/UL — SIGNIFICANT CHANGE UP (ref 150–400)
POTASSIUM SERPL-MCNC: 3.9 MMOL/L — SIGNIFICANT CHANGE UP (ref 3.5–5.3)
POTASSIUM SERPL-SCNC: 3.9 MMOL/L — SIGNIFICANT CHANGE UP (ref 3.5–5.3)
PROT UR-MCNC: 100 MG/DL
PROTHROM AB SERPL-ACNC: 11.2 SEC — SIGNIFICANT CHANGE UP (ref 10.6–13.6)
RBC # BLD: 4.41 M/UL — SIGNIFICANT CHANGE UP (ref 3.8–5.2)
RBC # FLD: 14.3 % — SIGNIFICANT CHANGE UP (ref 10.3–14.5)
SODIUM SERPL-SCNC: 141 MMOL/L — SIGNIFICANT CHANGE UP (ref 135–145)
SP GR SPEC: 1.02 — SIGNIFICANT CHANGE UP (ref 1.01–1.02)
UROBILINOGEN FLD QL: NEGATIVE MG/DL — SIGNIFICANT CHANGE UP
WBC # BLD: 7.56 K/UL — SIGNIFICANT CHANGE UP (ref 3.8–10.5)
WBC # FLD AUTO: 7.56 K/UL — SIGNIFICANT CHANGE UP (ref 3.8–10.5)

## 2021-07-23 PROCEDURE — G0463: CPT | Mod: 25

## 2021-07-23 PROCEDURE — 93005 ELECTROCARDIOGRAM TRACING: CPT

## 2021-07-23 PROCEDURE — 87086 URINE CULTURE/COLONY COUNT: CPT

## 2021-07-23 PROCEDURE — 71046 X-RAY EXAM CHEST 2 VIEWS: CPT | Mod: 26

## 2021-07-23 PROCEDURE — 85025 COMPLETE CBC W/AUTO DIFF WBC: CPT

## 2021-07-23 PROCEDURE — 36415 COLL VENOUS BLD VENIPUNCTURE: CPT

## 2021-07-23 PROCEDURE — 86900 BLOOD TYPING SEROLOGIC ABO: CPT

## 2021-07-23 PROCEDURE — 81001 URINALYSIS AUTO W/SCOPE: CPT

## 2021-07-23 PROCEDURE — 86901 BLOOD TYPING SEROLOGIC RH(D): CPT

## 2021-07-23 PROCEDURE — 86850 RBC ANTIBODY SCREEN: CPT

## 2021-07-23 PROCEDURE — 93010 ELECTROCARDIOGRAM REPORT: CPT

## 2021-07-23 PROCEDURE — 85730 THROMBOPLASTIN TIME PARTIAL: CPT

## 2021-07-23 PROCEDURE — 80048 BASIC METABOLIC PNL TOTAL CA: CPT

## 2021-07-23 PROCEDURE — 71046 X-RAY EXAM CHEST 2 VIEWS: CPT

## 2021-07-23 PROCEDURE — 85610 PROTHROMBIN TIME: CPT

## 2021-07-23 NOTE — H&P PST ADULT - ASSESSMENT
71 year old woman presents to PST for preprocedure exam. Patient is for planned Left PCNL with Dr Hope on 8/2.        Plan:  - PST instructions given ; NPO status instructions to be given by ASU .  - Pt instructed to take following meds with sip of water : no meds  - Pt instructed to take routine evening medications unless indicated .  -  Stop NSAIDS ( Aspirin Alev Motrin Mobic Diclofenac), herbal supplements , MVI , Vitamin fish oil 7 days prior to surgery  unless   directed by surgeon or cardiologist;   - Medical Optimization  with Dr Guajardo  - EZ wash instructions given   - Labs EKG CXR as per surgeon request.   -  Pt instructed to self quarantine .  - Covid Testing scheduled

## 2021-07-23 NOTE — H&P PST ADULT - NSICDXPASTMEDICALHX_GEN_ALL_CORE_FT
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

## 2021-07-23 NOTE — H&P PST ADULT - HEALTH CARE MAINTENANCE
covid vaccine - pfizer- completed- Februray 2021       Denies travel outside of state or country in the last 14 days.   Denies contact with known Coronavirus positive person.  Denies fever, chills, cough, congestion, runny nose, SOB or difficulty breathing, fatigue, muscle or body ache, headache. loss of taste or smell, N/V/D.

## 2021-07-23 NOTE — H&P PST ADULT - HISTORY OF PRESENT ILLNESS
71 year old woman presents to [facility] for preprocedure exam. Patient is for planned Left PCNL with Dr Hope on 8/2. As per patient a urinalysis was done recently which showed bacteria and protein- she had no symptoms of UTI at that time. Further imaging showed 1.3 cm calculus in the left renal pole and multiple small calculi. Patient denies UTI, she states that she get intermittent flank pain- no constant. She does not take anything for pain. No blood in urine but she was told she had large microscopic blood in the UA.

## 2021-07-24 DIAGNOSIS — Z01.818 ENCOUNTER FOR OTHER PREPROCEDURAL EXAMINATION: ICD-10-CM

## 2021-07-24 DIAGNOSIS — N20.0 CALCULUS OF KIDNEY: ICD-10-CM

## 2021-07-24 LAB
CULTURE RESULTS: SIGNIFICANT CHANGE UP
SPECIMEN SOURCE: SIGNIFICANT CHANGE UP

## 2021-07-25 PROBLEM — N20.0 CALCULUS OF KIDNEY: Chronic | Status: ACTIVE | Noted: 2021-07-23

## 2021-07-29 ENCOUNTER — APPOINTMENT (OUTPATIENT)
Dept: FAMILY MEDICINE | Facility: CLINIC | Age: 71
End: 2021-07-29
Payer: MEDICARE

## 2021-07-29 VITALS
HEART RATE: 87 BPM | DIASTOLIC BLOOD PRESSURE: 78 MMHG | BODY MASS INDEX: 30.36 KG/M2 | WEIGHT: 165 LBS | SYSTOLIC BLOOD PRESSURE: 122 MMHG | RESPIRATION RATE: 14 BRPM | HEIGHT: 62 IN | OXYGEN SATURATION: 98 %

## 2021-07-29 VITALS — TEMPERATURE: 98.1 F

## 2021-07-29 DIAGNOSIS — Z01.818 ENCOUNTER FOR OTHER PREPROCEDURAL EXAMINATION: ICD-10-CM

## 2021-07-29 PROCEDURE — 99214 OFFICE O/P EST MOD 30 MIN: CPT

## 2021-07-29 RX ORDER — HYDROCORTISONE 1 %
12 CREAM (GRAM) TOPICAL TWICE DAILY
Qty: 1 | Refills: 3 | Status: DISCONTINUED | COMMUNITY
Start: 2018-05-10 | End: 2021-07-29

## 2021-07-29 RX ORDER — CALCIUM CARBONATE/VITAMIN D3 600 MG-20
600-125 TABLET ORAL
Refills: 0 | Status: DISCONTINUED | COMMUNITY
End: 2021-07-29

## 2021-07-29 NOTE — PHYSICAL EXAM
[No Acute Distress] : no acute distress [Well Nourished] : well nourished [Well Developed] : well developed [Well-Appearing] : well-appearing [Normal Voice/Communication] : normal voice/communication [Normal Sclera/Conjunctiva] : normal sclera/conjunctiva [Normal Outer Ear/Nose] : the outer ears and nose were normal in appearance [Normal Oropharynx] : the oropharynx was normal [Normal TMs] : both tympanic membranes were normal [No Lymphadenopathy] : no lymphadenopathy [Supple] : supple [No Respiratory Distress] : no respiratory distress  [No Accessory Muscle Use] : no accessory muscle use [Clear to Auscultation] : lungs were clear to auscultation bilaterally [Normal Rate] : normal rate  [Regular Rhythm] : with a regular rhythm [Normal S1, S2] : normal S1 and S2 [No Carotid Bruits] : no carotid bruits [No Edema] : there was no peripheral edema [Soft] : abdomen soft [Non Tender] : non-tender [Non-distended] : non-distended [Coordination Grossly Intact] : coordination grossly intact [Normal Mood] : the mood was normal [de-identified] : 1/6 systolic murmur

## 2021-07-29 NOTE — HISTORY OF PRESENT ILLNESS
[No Pertinent Cardiac History] : no history of aortic stenosis, atrial fibrillation, coronary artery disease, recent myocardial infarction, or implantable device/pacemaker [No Pertinent Pulmonary History] : no history of asthma, COPD, sleep apnea, or smoking [No Adverse Anesthesia Reaction] : no adverse anesthesia reaction in self or family member [Aortic Stenosis] : no aortic stenosis [Atrial Fibrillation] : no atrial fibrillation [Coronary Artery Disease] : no coronary artery disease [Recent Myocardial Infarction] : no recent myocardial infarction [Implantable Device/Pacemaker] : no implantable device/pacemaker [Asthma] : no asthma [COPD] : no COPD [Sleep Apnea] : no sleep apnea [Smoker] : not a smoker [Family Member] : no family member with adverse anesthesia reaction/sudden death [Self] : no previous adverse anesthesia reaction [Chronic Anticoagulation] : no chronic anticoagulation [Chronic Kidney Disease] : no chronic kidney disease [Diabetes] : no diabetes [(Patient denies any chest pain, claudication, dyspnea on exertion, orthopnea, palpitations or syncope)] : Patient denies any chest pain, claudication, dyspnea on exertion, orthopnea, palpitations or syncope [FreeTextEntry1] : PCNL [FreeTextEntry2] : 8/2/2021/[facility] [FreeTextEntry3] : Dr Mack Hope [FreeTextEntry4] : pt presents for medical clearance

## 2021-07-30 ENCOUNTER — APPOINTMENT (OUTPATIENT)
Dept: DISASTER EMERGENCY | Facility: CLINIC | Age: 71
End: 2021-07-30

## 2021-07-31 LAB — SARS-COV-2 N GENE NPH QL NAA+PROBE: NOT DETECTED

## 2021-08-02 ENCOUNTER — OUTPATIENT (OUTPATIENT)
Dept: INPATIENT UNIT | Facility: HOSPITAL | Age: 71
LOS: 1 days | Discharge: HOME | End: 2021-08-02
Payer: MEDICARE

## 2021-08-02 ENCOUNTER — RESULT REVIEW (OUTPATIENT)
Age: 71
End: 2021-08-02

## 2021-08-02 ENCOUNTER — APPOINTMENT (OUTPATIENT)
Dept: UROLOGY | Facility: HOSPITAL | Age: 71
End: 2021-08-02

## 2021-08-02 VITALS
HEIGHT: 62.5 IN | WEIGHT: 164.91 LBS | SYSTOLIC BLOOD PRESSURE: 144 MMHG | OXYGEN SATURATION: 99 % | HEART RATE: 70 BPM | DIASTOLIC BLOOD PRESSURE: 61 MMHG | TEMPERATURE: 97 F | RESPIRATION RATE: 16 BRPM

## 2021-08-02 DIAGNOSIS — M21.611 BUNION OF RIGHT FOOT: Chronic | ICD-10-CM

## 2021-08-02 DIAGNOSIS — N20.0 CALCULUS OF KIDNEY: ICD-10-CM

## 2021-08-02 DIAGNOSIS — Z98.890 OTHER SPECIFIED POSTPROCEDURAL STATES: Chronic | ICD-10-CM

## 2021-08-02 LAB
ANION GAP SERPL CALC-SCNC: 4 MMOL/L — LOW (ref 5–17)
BASOPHILS # BLD AUTO: 0.05 K/UL — SIGNIFICANT CHANGE UP (ref 0–0.2)
BASOPHILS NFR BLD AUTO: 0.3 % — SIGNIFICANT CHANGE UP (ref 0–2)
BUN SERPL-MCNC: 15 MG/DL — SIGNIFICANT CHANGE UP (ref 7–23)
CALCIUM SERPL-MCNC: 8.2 MG/DL — LOW (ref 8.5–10.1)
CHLORIDE SERPL-SCNC: 114 MMOL/L — HIGH (ref 96–108)
CO2 SERPL-SCNC: 26 MMOL/L — SIGNIFICANT CHANGE UP (ref 22–31)
CREAT SERPL-MCNC: 0.9 MG/DL — SIGNIFICANT CHANGE UP (ref 0.5–1.3)
EOSINOPHIL # BLD AUTO: 0 K/UL — SIGNIFICANT CHANGE UP (ref 0–0.5)
EOSINOPHIL NFR BLD AUTO: 0 % — SIGNIFICANT CHANGE UP (ref 0–6)
GLUCOSE SERPL-MCNC: 146 MG/DL — HIGH (ref 70–99)
HCT VFR BLD CALC: 39.1 % — SIGNIFICANT CHANGE UP (ref 34.5–45)
HGB BLD-MCNC: 12.4 G/DL — SIGNIFICANT CHANGE UP (ref 11.5–15.5)
IMM GRANULOCYTES NFR BLD AUTO: 0.3 % — SIGNIFICANT CHANGE UP (ref 0–1.5)
LYMPHOCYTES # BLD AUTO: 1 K/UL — SIGNIFICANT CHANGE UP (ref 1–3.3)
LYMPHOCYTES # BLD AUTO: 6.8 % — LOW (ref 13–44)
MCHC RBC-ENTMCNC: 30 PG — SIGNIFICANT CHANGE UP (ref 27–34)
MCHC RBC-ENTMCNC: 31.7 GM/DL — LOW (ref 32–36)
MCV RBC AUTO: 94.4 FL — SIGNIFICANT CHANGE UP (ref 80–100)
MONOCYTES # BLD AUTO: 0.31 K/UL — SIGNIFICANT CHANGE UP (ref 0–0.9)
MONOCYTES NFR BLD AUTO: 2.1 % — SIGNIFICANT CHANGE UP (ref 2–14)
NEUTROPHILS # BLD AUTO: 13.33 K/UL — HIGH (ref 1.8–7.4)
NEUTROPHILS NFR BLD AUTO: 90.5 % — HIGH (ref 43–77)
PLATELET # BLD AUTO: 196 K/UL — SIGNIFICANT CHANGE UP (ref 150–400)
POTASSIUM SERPL-MCNC: 4 MMOL/L — SIGNIFICANT CHANGE UP (ref 3.5–5.3)
POTASSIUM SERPL-SCNC: 4 MMOL/L — SIGNIFICANT CHANGE UP (ref 3.5–5.3)
RBC # BLD: 4.14 M/UL — SIGNIFICANT CHANGE UP (ref 3.8–5.2)
RBC # FLD: 14.3 % — SIGNIFICANT CHANGE UP (ref 10.3–14.5)
SODIUM SERPL-SCNC: 144 MMOL/L — SIGNIFICANT CHANGE UP (ref 135–145)
WBC # BLD: 14.74 K/UL — HIGH (ref 3.8–10.5)
WBC # FLD AUTO: 14.74 K/UL — HIGH (ref 3.8–10.5)

## 2021-08-02 PROCEDURE — 50432 PLMT NEPHROSTOMY CATHETER: CPT | Mod: LT

## 2021-08-02 PROCEDURE — 74420 UROGRAPHY RTRGR +-KUB: CPT | Mod: 26

## 2021-08-02 PROCEDURE — 50081 PERQ NL/PL LITHOTRP CPLX>2CM: CPT | Mod: LT

## 2021-08-02 PROCEDURE — C9399: CPT

## 2021-08-02 PROCEDURE — C1887: CPT

## 2021-08-02 PROCEDURE — C1726: CPT

## 2021-08-02 PROCEDURE — C1894: CPT

## 2021-08-02 PROCEDURE — C2625: CPT

## 2021-08-02 PROCEDURE — C1769: CPT

## 2021-08-02 PROCEDURE — 76000 FLUOROSCOPY <1 HR PHYS/QHP: CPT

## 2021-08-02 PROCEDURE — 88300 SURGICAL PATH GROSS: CPT | Mod: 26

## 2021-08-02 PROCEDURE — C1889: CPT

## 2021-08-02 PROCEDURE — C1729: CPT

## 2021-08-02 PROCEDURE — 36415 COLL VENOUS BLD VENIPUNCTURE: CPT

## 2021-08-02 PROCEDURE — 82365 CALCULUS SPECTROSCOPY: CPT | Mod: 59

## 2021-08-02 PROCEDURE — 71045 X-RAY EXAM CHEST 1 VIEW: CPT | Mod: 26

## 2021-08-02 PROCEDURE — 71045 X-RAY EXAM CHEST 1 VIEW: CPT

## 2021-08-02 PROCEDURE — 88300 SURGICAL PATH GROSS: CPT

## 2021-08-02 PROCEDURE — 80048 BASIC METABOLIC PNL TOTAL CA: CPT

## 2021-08-02 PROCEDURE — 85025 COMPLETE CBC W/AUTO DIFF WBC: CPT

## 2021-08-02 RX ORDER — KETOROLAC TROMETHAMINE 30 MG/ML
30 SYRINGE (ML) INJECTION EVERY 6 HOURS
Refills: 0 | Status: DISCONTINUED | OUTPATIENT
Start: 2021-08-02 | End: 2021-08-03

## 2021-08-02 RX ORDER — ACETAMINOPHEN 500 MG
650 TABLET ORAL EVERY 6 HOURS
Refills: 0 | Status: DISCONTINUED | OUTPATIENT
Start: 2021-08-02 | End: 2021-08-03

## 2021-08-02 RX ORDER — OXYCODONE HYDROCHLORIDE 5 MG/1
5 TABLET ORAL ONCE
Refills: 0 | Status: DISCONTINUED | OUTPATIENT
Start: 2021-08-02 | End: 2021-08-02

## 2021-08-02 RX ORDER — OXYBUTYNIN CHLORIDE 5 MG
5 TABLET ORAL ONCE
Refills: 0 | Status: COMPLETED | OUTPATIENT
Start: 2021-08-02 | End: 2021-08-02

## 2021-08-02 RX ORDER — FENTANYL CITRATE 50 UG/ML
50 INJECTION INTRAVENOUS
Refills: 0 | Status: DISCONTINUED | OUTPATIENT
Start: 2021-08-02 | End: 2021-08-02

## 2021-08-02 RX ORDER — OXYBUTYNIN CHLORIDE 5 MG
5 TABLET ORAL THREE TIMES A DAY
Refills: 0 | Status: DISCONTINUED | OUTPATIENT
Start: 2021-08-02 | End: 2021-08-03

## 2021-08-02 RX ORDER — SODIUM CHLORIDE 9 MG/ML
1000 INJECTION INTRAMUSCULAR; INTRAVENOUS; SUBCUTANEOUS
Refills: 0 | Status: DISCONTINUED | OUTPATIENT
Start: 2021-08-02 | End: 2021-08-02

## 2021-08-02 RX ORDER — ONDANSETRON 8 MG/1
4 TABLET, FILM COATED ORAL ONCE
Refills: 0 | Status: DISCONTINUED | OUTPATIENT
Start: 2021-08-02 | End: 2021-08-02

## 2021-08-02 RX ORDER — PHENAZOPYRIDINE HCL 100 MG
200 TABLET ORAL ONCE
Refills: 0 | Status: COMPLETED | OUTPATIENT
Start: 2021-08-02 | End: 2021-08-02

## 2021-08-02 RX ORDER — MORPHINE SULFATE 50 MG/1
4 CAPSULE, EXTENDED RELEASE ORAL EVERY 6 HOURS
Refills: 0 | Status: DISCONTINUED | OUTPATIENT
Start: 2021-08-02 | End: 2021-08-03

## 2021-08-02 RX ORDER — HEPARIN SODIUM 5000 [USP'U]/ML
5000 INJECTION INTRAVENOUS; SUBCUTANEOUS EVERY 8 HOURS
Refills: 0 | Status: DISCONTINUED | OUTPATIENT
Start: 2021-08-02 | End: 2021-08-03

## 2021-08-02 RX ORDER — PROCHLORPERAZINE MALEATE 5 MG
10 TABLET ORAL ONCE
Refills: 0 | Status: COMPLETED | OUTPATIENT
Start: 2021-08-02 | End: 2021-08-02

## 2021-08-02 RX ORDER — SODIUM CHLORIDE 9 MG/ML
1000 INJECTION, SOLUTION INTRAVENOUS
Refills: 0 | Status: DISCONTINUED | OUTPATIENT
Start: 2021-08-02 | End: 2021-08-02

## 2021-08-02 RX ORDER — SODIUM CHLORIDE 9 MG/ML
1000 INJECTION INTRAMUSCULAR; INTRAVENOUS; SUBCUTANEOUS ONCE
Refills: 0 | Status: COMPLETED | OUTPATIENT
Start: 2021-08-02 | End: 2021-08-02

## 2021-08-02 RX ORDER — RALOXIFENE HYDROCHLORIDE 60 MG/1
1 TABLET, COATED ORAL
Qty: 0 | Refills: 0 | DISCHARGE

## 2021-08-02 RX ORDER — ONDANSETRON 8 MG/1
4 TABLET, FILM COATED ORAL EVERY 6 HOURS
Refills: 0 | Status: DISCONTINUED | OUTPATIENT
Start: 2021-08-02 | End: 2021-08-03

## 2021-08-02 RX ORDER — ONDANSETRON 8 MG/1
4 TABLET, FILM COATED ORAL EVERY 6 HOURS
Refills: 0 | Status: DISCONTINUED | OUTPATIENT
Start: 2021-08-02 | End: 2021-08-02

## 2021-08-02 RX ORDER — SODIUM CHLORIDE 9 MG/ML
1000 INJECTION INTRAMUSCULAR; INTRAVENOUS; SUBCUTANEOUS
Refills: 0 | Status: DISCONTINUED | OUTPATIENT
Start: 2021-08-02 | End: 2021-08-03

## 2021-08-02 RX ORDER — TAMSULOSIN HYDROCHLORIDE 0.4 MG/1
0.4 CAPSULE ORAL DAILY
Refills: 0 | Status: DISCONTINUED | OUTPATIENT
Start: 2021-08-02 | End: 2021-08-03

## 2021-08-02 RX ORDER — PHENAZOPYRIDINE HCL 100 MG
200 TABLET ORAL THREE TIMES A DAY
Refills: 0 | Status: DISCONTINUED | OUTPATIENT
Start: 2021-08-02 | End: 2021-08-03

## 2021-08-02 RX ADMIN — FENTANYL CITRATE 50 MICROGRAM(S): 50 INJECTION INTRAVENOUS at 13:49

## 2021-08-02 RX ADMIN — Medication 5 MILLIGRAM(S): at 21:20

## 2021-08-02 RX ADMIN — FENTANYL CITRATE 50 MICROGRAM(S): 50 INJECTION INTRAVENOUS at 13:45

## 2021-08-02 RX ADMIN — OXYCODONE HYDROCHLORIDE 5 MILLIGRAM(S): 5 TABLET ORAL at 14:57

## 2021-08-02 RX ADMIN — Medication 10 MILLIGRAM(S): at 16:29

## 2021-08-02 RX ADMIN — SODIUM CHLORIDE 2000 MILLILITER(S): 9 INJECTION INTRAMUSCULAR; INTRAVENOUS; SUBCUTANEOUS at 14:25

## 2021-08-02 RX ADMIN — HEPARIN SODIUM 5000 UNIT(S): 5000 INJECTION INTRAVENOUS; SUBCUTANEOUS at 21:20

## 2021-08-02 RX ADMIN — Medication 200 MILLIGRAM(S): at 14:29

## 2021-08-02 RX ADMIN — SODIUM CHLORIDE 125 MILLILITER(S): 9 INJECTION INTRAMUSCULAR; INTRAVENOUS; SUBCUTANEOUS at 16:34

## 2021-08-02 RX ADMIN — OXYCODONE HYDROCHLORIDE 5 MILLIGRAM(S): 5 TABLET ORAL at 14:29

## 2021-08-02 RX ADMIN — TAMSULOSIN HYDROCHLORIDE 0.4 MILLIGRAM(S): 0.4 CAPSULE ORAL at 21:20

## 2021-08-02 RX ADMIN — Medication 5 MILLIGRAM(S): at 14:29

## 2021-08-02 NOTE — BRIEF OPERATIVE NOTE - ANESTHESIOLOGIST NAME
CC: Patient presents with:  Weight Check: up 1 lb       HPI:   Obesity, doing well on phentermine, contrave and metformin. No chest pain.        Current Outpatient Prescriptions:  Phentermine HCl 37.5 MG Oral Tab Take 1 tablet (37.5 mg total) by mouth e ribs     Hypogonadism male     Vitamin D deficiency     Hyperlipidemia     Prediabetes     Fatigue     MALINDA (obstructive sleep apnea)     Back pain     Obesity (BMI 30-39. 9)     Encounter for therapeutic drug monitoring     ED (erectile dysfunction)     Con Dr Varela

## 2021-08-02 NOTE — BRIEF OPERATIVE NOTE - OPERATION/FINDINGS
s/p left nephrostomy tube, 4F, 40cm catheter inserted into superior calyx terminating within the mid left ureter. Patient will be transferred to OR for left sided PCNl.
stones in LEFT upper pole of kidney and  LEFT renal pelvis, total stone volume 4 cm

## 2021-08-02 NOTE — BRIEF OPERATIVE NOTE - NSICDXBRIEFPROCEDURE_GEN_ALL_CORE_FT
PROCEDURES:  Percutaneous removal of calculus of kidney, 2 cm or more in diameter 02-Aug-2021 13:42:37  Mack Hope

## 2021-08-03 ENCOUNTER — TRANSCRIPTION ENCOUNTER (OUTPATIENT)
Age: 71
End: 2021-08-03

## 2021-08-03 VITALS
SYSTOLIC BLOOD PRESSURE: 110 MMHG | TEMPERATURE: 99 F | OXYGEN SATURATION: 98 % | HEART RATE: 80 BPM | DIASTOLIC BLOOD PRESSURE: 49 MMHG | RESPIRATION RATE: 16 BRPM

## 2021-08-03 LAB
ANION GAP SERPL CALC-SCNC: 4 MMOL/L — LOW (ref 5–17)
BASOPHILS # BLD AUTO: 0.03 K/UL — SIGNIFICANT CHANGE UP (ref 0–0.2)
BASOPHILS NFR BLD AUTO: 0.2 % — SIGNIFICANT CHANGE UP (ref 0–2)
BUN SERPL-MCNC: 13 MG/DL — SIGNIFICANT CHANGE UP (ref 7–23)
CALCIUM SERPL-MCNC: 8.3 MG/DL — LOW (ref 8.5–10.1)
CHLORIDE SERPL-SCNC: 111 MMOL/L — HIGH (ref 96–108)
CO2 SERPL-SCNC: 27 MMOL/L — SIGNIFICANT CHANGE UP (ref 22–31)
CREAT SERPL-MCNC: 0.9 MG/DL — SIGNIFICANT CHANGE UP (ref 0.5–1.3)
EOSINOPHIL # BLD AUTO: 0 K/UL — SIGNIFICANT CHANGE UP (ref 0–0.5)
EOSINOPHIL NFR BLD AUTO: 0 % — SIGNIFICANT CHANGE UP (ref 0–6)
GLUCOSE SERPL-MCNC: 110 MG/DL — HIGH (ref 70–99)
HCT VFR BLD CALC: 33.9 % — LOW (ref 34.5–45)
HGB BLD-MCNC: 10.9 G/DL — LOW (ref 11.5–15.5)
IMM GRANULOCYTES NFR BLD AUTO: 0.3 % — SIGNIFICANT CHANGE UP (ref 0–1.5)
LYMPHOCYTES # BLD AUTO: 1.56 K/UL — SIGNIFICANT CHANGE UP (ref 1–3.3)
LYMPHOCYTES # BLD AUTO: 11.5 % — LOW (ref 13–44)
MCHC RBC-ENTMCNC: 29.9 PG — SIGNIFICANT CHANGE UP (ref 27–34)
MCHC RBC-ENTMCNC: 32.2 GM/DL — SIGNIFICANT CHANGE UP (ref 32–36)
MCV RBC AUTO: 93.1 FL — SIGNIFICANT CHANGE UP (ref 80–100)
MONOCYTES # BLD AUTO: 1.15 K/UL — HIGH (ref 0–0.9)
MONOCYTES NFR BLD AUTO: 8.5 % — SIGNIFICANT CHANGE UP (ref 2–14)
NEUTROPHILS # BLD AUTO: 10.78 K/UL — HIGH (ref 1.8–7.4)
NEUTROPHILS NFR BLD AUTO: 79.5 % — HIGH (ref 43–77)
PLATELET # BLD AUTO: 181 K/UL — SIGNIFICANT CHANGE UP (ref 150–400)
POTASSIUM SERPL-MCNC: 4.3 MMOL/L — SIGNIFICANT CHANGE UP (ref 3.5–5.3)
POTASSIUM SERPL-SCNC: 4.3 MMOL/L — SIGNIFICANT CHANGE UP (ref 3.5–5.3)
RBC # BLD: 3.64 M/UL — LOW (ref 3.8–5.2)
RBC # FLD: 14.3 % — SIGNIFICANT CHANGE UP (ref 10.3–14.5)
SODIUM SERPL-SCNC: 142 MMOL/L — SIGNIFICANT CHANGE UP (ref 135–145)
WBC # BLD: 13.56 K/UL — HIGH (ref 3.8–10.5)
WBC # FLD AUTO: 13.56 K/UL — HIGH (ref 3.8–10.5)

## 2021-08-03 RX ORDER — MOXIFLOXACIN HYDROCHLORIDE TABLETS, 400 MG 400 MG/1
1 TABLET, FILM COATED ORAL
Qty: 10 | Refills: 0
Start: 2021-08-03 | End: 2021-08-07

## 2021-08-03 RX ORDER — OXYBUTYNIN CHLORIDE 5 MG
1 TABLET ORAL
Qty: 9 | Refills: 0
Start: 2021-08-03 | End: 2021-08-05

## 2021-08-03 RX ORDER — TAMSULOSIN HYDROCHLORIDE 0.4 MG/1
1 CAPSULE ORAL
Qty: 30 | Refills: 0
Start: 2021-08-03 | End: 2021-09-01

## 2021-08-03 RX ORDER — OXYCODONE HYDROCHLORIDE 5 MG/1
1 TABLET ORAL
Qty: 12 | Refills: 0
Start: 2021-08-03 | End: 2021-08-05

## 2021-08-03 RX ORDER — PHENAZOPYRIDINE HCL 100 MG
1 TABLET ORAL
Qty: 9 | Refills: 0
Start: 2021-08-03 | End: 2021-08-05

## 2021-08-03 RX ADMIN — Medication 5 MILLIGRAM(S): at 06:00

## 2021-08-03 RX ADMIN — HEPARIN SODIUM 5000 UNIT(S): 5000 INJECTION INTRAVENOUS; SUBCUTANEOUS at 06:00

## 2021-08-03 NOTE — PROGRESS NOTE ADULT - SUBJECTIVE AND OBJECTIVE BOX
Patient is a 70yo Female POD 1 s/p left PCNL and ureteral stent placement. Patient is doing well post op. No acute complaints. She denies any abd/flank pain. Bowers with yellow/orange tinge urine.     PE  VITALS T(C): 37.1 (08-03-21 @ 08:39), Max: 37.1 (08-02-21 @ 17:30)  HR: 80 (08-03-21 @ 08:39) (58 - 82)  BP: 110/49 (08-03-21 @ 08:39) (103/43 - 138/65)  RR: 16 (08-03-21 @ 08:39) (14 - 20)  SpO2: 98% (08-03-21 @ 08:39) (92% - 98%)     Gen: NAD, AAOx3  Lungs: No resp distress  Abdomen: soft, non-tender, non-distended  : bowers, no penis or scrotal swelling.   Extremities: no swelling, no calf tenderness, no ulcers.         LABS                                            10.9   13.56 )-----------( 181      ( 03 Aug 2021 06:45 )             33.9   08-03    142  |  111<H>  |  13  ----------------------------<  110<H>  4.3   |  27  |  0.90    Ca    8.3<L>      03 Aug 2021 06:45

## 2021-08-03 NOTE — ASU DISCHARGE PLAN (ADULT/PEDIATRIC) - CARE PROVIDER_API CALL
Mack Hope)  Urology  [address], [address] [place] [address]  Phone: [phone]  Fax: [phone]  Follow Up Time: 1 week

## 2021-08-03 NOTE — DISCHARGE NOTE NURSING/CASE MANAGEMENT/SOCIAL WORK - PATIENT PORTAL LINK FT
You can access the FollowMyHealth Patient Portal offered by [facility] by registering at the following website: [email or web address]. By joining [facility]’s FollowMyHealth portal, you will also be able to view your health information using other applications (apps) compatible with our system.

## 2021-08-03 NOTE — PROGRESS NOTE ADULT - ASSESSMENT
72yo Female POD 1 s/p left PCNL and ureteral stent placement. Patient is doing well post op.   - D/C bowers prior to discharge home  - Will discharge pt with Oxybutynin 5 mg Q8hrs PRN bladder spasms, Pyridium 200 mg Q8hrs PRN dysuria, Oxycodone PRN, Cipro 500 mg BID x 5 days and Flomax 0.4 mg QHS  - Follow up with Dr. Hope  in 1-2 weeks for stent management    Case discussed with Dr. Hope

## 2021-08-09 DIAGNOSIS — Z87.891 PERSONAL HISTORY OF NICOTINE DEPENDENCE: ICD-10-CM

## 2021-08-09 DIAGNOSIS — N20.0 CALCULUS OF KIDNEY: ICD-10-CM

## 2021-08-09 DIAGNOSIS — Z79.810 LONG TERM (CURRENT) USE OF SELECTIVE ESTROGEN RECEPTOR MODULATORS (SERMS): ICD-10-CM

## 2021-08-09 DIAGNOSIS — R01.1 CARDIAC MURMUR, UNSPECIFIED: ICD-10-CM

## 2021-08-09 DIAGNOSIS — M81.0 AGE-RELATED OSTEOPOROSIS WITHOUT CURRENT PATHOLOGICAL FRACTURE: ICD-10-CM

## 2021-08-11 ENCOUNTER — APPOINTMENT (OUTPATIENT)
Dept: UROLOGY | Facility: CLINIC | Age: 71
End: 2021-08-11
Payer: MEDICARE

## 2021-08-11 VITALS
HEIGHT: 62 IN | OXYGEN SATURATION: 98 % | BODY MASS INDEX: 31.28 KG/M2 | WEIGHT: 170 LBS | SYSTOLIC BLOOD PRESSURE: 119 MMHG | HEART RATE: 86 BPM | DIASTOLIC BLOOD PRESSURE: 66 MMHG | TEMPERATURE: 97 F

## 2021-08-11 PROCEDURE — 99024 POSTOP FOLLOW-UP VISIT: CPT

## 2021-08-30 ENCOUNTER — APPOINTMENT (OUTPATIENT)
Dept: UROLOGY | Facility: CLINIC | Age: 71
End: 2021-08-30

## 2021-08-30 ENCOUNTER — APPOINTMENT (OUTPATIENT)
Dept: DISASTER EMERGENCY | Facility: CLINIC | Age: 71
End: 2021-08-30

## 2021-09-21 ENCOUNTER — RX RENEWAL (OUTPATIENT)
Age: 71
End: 2021-09-21

## 2021-09-22 ENCOUNTER — OUTPATIENT (OUTPATIENT)
Dept: OUTPATIENT SERVICES | Facility: HOSPITAL | Age: 71
LOS: 1 days | End: 2021-09-22
Payer: MEDICARE

## 2021-09-22 ENCOUNTER — APPOINTMENT (OUTPATIENT)
Dept: ULTRASOUND IMAGING | Facility: CLINIC | Age: 71
End: 2021-09-22
Payer: MEDICARE

## 2021-09-22 ENCOUNTER — APPOINTMENT (OUTPATIENT)
Dept: RADIOLOGY | Facility: CLINIC | Age: 71
End: 2021-09-22
Payer: MEDICARE

## 2021-09-22 DIAGNOSIS — N20.0 CALCULUS OF KIDNEY: ICD-10-CM

## 2021-09-22 DIAGNOSIS — Z98.890 OTHER SPECIFIED POSTPROCEDURAL STATES: Chronic | ICD-10-CM

## 2021-09-22 DIAGNOSIS — M21.611 BUNION OF RIGHT FOOT: Chronic | ICD-10-CM

## 2021-09-22 LAB
ANION GAP SERPL CALC-SCNC: 12 MMOL/L
BUN SERPL-MCNC: 29 MG/DL
CALCIUM SERPL-MCNC: 10.1 MG/DL
CALCIUM SERPL-MCNC: 10.1 MG/DL
CHLORIDE SERPL-SCNC: 105 MMOL/L
CO2 SERPL-SCNC: 25 MMOL/L
CREAT SERPL-MCNC: 1.01 MG/DL
GLUCOSE SERPL-MCNC: 116 MG/DL
PARATHYROID HORMONE INTACT: 57 PG/ML
POTASSIUM SERPL-SCNC: 4.2 MMOL/L
SODIUM SERPL-SCNC: 142 MMOL/L
TSH SERPL-ACNC: 3.2 UIU/ML
URATE SERPL-MCNC: 6.2 MG/DL

## 2021-09-22 PROCEDURE — 74018 RADEX ABDOMEN 1 VIEW: CPT

## 2021-09-22 PROCEDURE — 76775 US EXAM ABDO BACK WALL LIM: CPT

## 2021-09-22 PROCEDURE — 74018 RADEX ABDOMEN 1 VIEW: CPT | Mod: 26

## 2021-09-22 PROCEDURE — 76775 US EXAM ABDO BACK WALL LIM: CPT | Mod: 26

## 2021-09-27 ENCOUNTER — APPOINTMENT (OUTPATIENT)
Dept: UROLOGY | Facility: CLINIC | Age: 71
End: 2021-09-27
Payer: MEDICARE

## 2021-09-27 VITALS — TEMPERATURE: 97.9 F

## 2021-09-27 PROCEDURE — 99024 POSTOP FOLLOW-UP VISIT: CPT

## 2021-09-27 NOTE — HISTORY OF PRESENT ILLNESS
[FreeTextEntry1] : The patient is a 70-year-old woman previously seen by Dr Moore for gross hematuria. She was found to have large stone burden on CT, cluster of stones measuring 2.5 in LEFT renal pelvis plus additional 1 cm stone in lower pole. She is seen 7/12/2021 to discuss treatment options. \par \par 09/27/2021: Patient presents for follow up. She is s/p LEFT PCNL and stent removal. Doing well, no complaints. Does report some JACOB after voiding. 24 Hr urine collection reviewed, showed pH 5.6, mild hyperuricosuria, urine volume 1.7L. RBUS and KUB neg.

## 2021-09-27 NOTE — ASSESSMENT
[FreeTextEntry1] : 71 yo F with 2.5 cm renal stones on LEFT s/p PCNL. Discussed low animal protein diet, increasing fluids to 2-3L per day, and adding potassium citrate. Pt agrees. Will RTO in 6 weeks for sx check, plan for repeat 24 hr urine in 6 months.

## 2021-10-04 ENCOUNTER — RX CHANGE (OUTPATIENT)
Age: 71
End: 2021-10-04

## 2021-11-08 ENCOUNTER — APPOINTMENT (OUTPATIENT)
Dept: UROLOGY | Facility: CLINIC | Age: 71
End: 2021-11-08
Payer: MEDICARE

## 2021-11-08 VITALS — TEMPERATURE: 97.8 F

## 2021-11-08 PROCEDURE — 99213 OFFICE O/P EST LOW 20 MIN: CPT

## 2021-11-08 NOTE — ASSESSMENT
[FreeTextEntry1] : 71 yo F with 2.5 cm renal stones on LEFT s/p PCNL. Discussed low animal protein diet, increasing fluids to 2-3L per day, and continue potassium citrate. Pt agrees. Will RTO with repeat 24 hr urine in 6 months.

## 2021-11-08 NOTE — HISTORY OF PRESENT ILLNESS
[FreeTextEntry1] : The patient is a 70-year-old woman previously seen by Dr Moore for gross hematuria. She was found to have large stone burden on CT, cluster of stones measuring 2.5 in LEFT renal pelvis plus additional 1 cm stone in lower pole. She is seen 7/12/2021 to discuss treatment options. \par \par 09/27/2021: Patient presents for follow up. She is s/p LEFT PCNL and stent removal. Doing well, no complaints. Does report some JACOB after voiding. 24 Hr urine collection reviewed, showed pH 5.6, mild hyperuricosuria, urine volume 1.7L. RBUS and KUB neg. \par \par 11/08/2021: Patient presents for follow up. She reports tolerating k citrate. occasional flank pain, but momentary and positional. No new  complaints, no GI complaints.

## 2021-12-20 ENCOUNTER — TRANSCRIPTION ENCOUNTER (OUTPATIENT)
Age: 71
End: 2021-12-20

## 2022-01-14 ENCOUNTER — RX RENEWAL (OUTPATIENT)
Age: 72
End: 2022-01-14

## 2022-04-11 RX ORDER — RALOXIFENE HYDROCHLORIDE 60 MG/1
60 TABLET, FILM COATED ORAL
Qty: 90 | Refills: 3 | Status: ACTIVE | COMMUNITY
Start: 2018-02-26 | End: 1900-01-01

## 2022-05-09 ENCOUNTER — APPOINTMENT (OUTPATIENT)
Dept: UROLOGY | Facility: CLINIC | Age: 72
End: 2022-05-09
Payer: MEDICARE

## 2022-05-09 VITALS
SYSTOLIC BLOOD PRESSURE: 158 MMHG | BODY MASS INDEX: 30.91 KG/M2 | DIASTOLIC BLOOD PRESSURE: 74 MMHG | WEIGHT: 168 LBS | HEIGHT: 62 IN | RESPIRATION RATE: 14 BRPM | TEMPERATURE: 98 F | HEART RATE: 76 BPM

## 2022-05-09 PROCEDURE — 99213 OFFICE O/P EST LOW 20 MIN: CPT

## 2022-05-09 NOTE — ASSESSMENT
[FreeTextEntry1] : 71 yo F with 2.5 cm renal stones on LEFT s/p PCNL. Discussed low animal protein diet, increasing fluids to 2-3L per day, and continue potassium citrate. Will order liquid formulation to ease her ability to take it. She is moving to [place] and will find urologist down there. If this changes, will plan to follow up in 6 months for another urine collection and RBUS/KUB

## 2022-05-09 NOTE — HISTORY OF PRESENT ILLNESS
[FreeTextEntry1] : The patient is a 70-year-old woman previously seen by Dr Moore for gross hematuria. She was found to have large stone burden on CT, cluster of stones measuring 2.5 in LEFT renal pelvis plus additional 1 cm stone in lower pole. She is seen 7/12/2021 to discuss treatment options. \par \par 09/27/2021: Patient presents for follow up. She is s/p LEFT PCNL and stent removal. Doing well, no complaints. Does report some JACOB after voiding. 24 Hr urine collection reviewed, showed pH 5.6, mild hyperuricosuria, urine volume 1.7L. RBUS and KUB neg. \par \par 11/08/2021: Patient presents for follow up. She reports tolerating k citrate. occasional flank pain, but momentary and positional. No new  complaints, no GI complaints.\par \par 05/09/2022: Patient presents for follow up. She reports she does find K citrate pills difficult to swallow and sometimes skips evening dose. She denies flank pain or other  complaints. 24 hr urine collection shows good urine volume, pH 5.1 and citrate 488, both lower than previous.

## 2022-05-12 ENCOUNTER — NON-APPOINTMENT (OUTPATIENT)
Age: 72
End: 2022-05-12

## 2022-05-12 DIAGNOSIS — N20.0 CALCULUS OF KIDNEY: ICD-10-CM

## 2022-05-12 RX ORDER — POTASSIUM CITRATE 15 MEQ/1
15 MEQ TABLET, EXTENDED RELEASE ORAL TWICE DAILY
Qty: 180 | Refills: 3 | Status: ACTIVE | COMMUNITY
Start: 2022-05-12 | End: 1900-01-01

## 2022-06-01 ENCOUNTER — APPOINTMENT (OUTPATIENT)
Dept: FAMILY MEDICINE | Facility: CLINIC | Age: 72
End: 2022-06-01
Payer: MEDICARE

## 2022-06-01 ENCOUNTER — LABORATORY RESULT (OUTPATIENT)
Age: 72
End: 2022-06-01

## 2022-06-01 VITALS
BODY MASS INDEX: 30.91 KG/M2 | DIASTOLIC BLOOD PRESSURE: 70 MMHG | OXYGEN SATURATION: 97 % | WEIGHT: 168 LBS | HEART RATE: 71 BPM | SYSTOLIC BLOOD PRESSURE: 130 MMHG | RESPIRATION RATE: 12 BRPM | HEIGHT: 62 IN

## 2022-06-01 VITALS — TEMPERATURE: 97.3 F

## 2022-06-01 DIAGNOSIS — Z01.818 ENCOUNTER FOR OTHER PREPROCEDURAL EXAMINATION: ICD-10-CM

## 2022-06-01 DIAGNOSIS — Z87.448 PERSONAL HISTORY OF OTHER DISEASES OF URINARY SYSTEM: ICD-10-CM

## 2022-06-01 DIAGNOSIS — D18.03 HEMANGIOMA OF INTRA-ABDOMINAL STRUCTURES: ICD-10-CM

## 2022-06-01 DIAGNOSIS — K76.89 OTHER SPECIFIED DISEASES OF LIVER: ICD-10-CM

## 2022-06-01 DIAGNOSIS — R39.9 UNSPECIFIED SYMPTOMS AND SIGNS INVOLVING THE GENITOURINARY SYSTEM: ICD-10-CM

## 2022-06-01 DIAGNOSIS — R82.81 PYURIA: ICD-10-CM

## 2022-06-01 DIAGNOSIS — K76.9 LIVER DISEASE, UNSPECIFIED: ICD-10-CM

## 2022-06-01 DIAGNOSIS — K76.0 FATTY (CHANGE OF) LIVER, NOT ELSEWHERE CLASSIFIED: ICD-10-CM

## 2022-06-01 DIAGNOSIS — Z00.00 ENCOUNTER FOR GENERAL ADULT MEDICAL EXAMINATION W/OUT ABNORMAL FINDINGS: ICD-10-CM

## 2022-06-01 PROCEDURE — 36415 COLL VENOUS BLD VENIPUNCTURE: CPT

## 2022-06-01 PROCEDURE — G0439: CPT

## 2022-06-01 RX ORDER — POTASSIUM CITRATE 5 MEQ/1
5 MEQ TABLET, EXTENDED RELEASE ORAL
Qty: 180 | Refills: 2 | Status: DISCONTINUED | COMMUNITY
Start: 2021-09-27 | End: 2022-06-01

## 2022-06-01 RX ORDER — HALOBETASOL PROPIONATE 0.5 MG/G
0.05 OINTMENT TOPICAL DAILY
Qty: 15 | Refills: 0 | Status: DISCONTINUED | COMMUNITY
Start: 2021-05-24 | End: 2022-06-01

## 2022-06-01 RX ORDER — OXYCODONE 5 MG/1
5 TABLET ORAL EVERY 6 HOURS
Qty: 8 | Refills: 0 | Status: DISCONTINUED | COMMUNITY
Start: 2021-08-03 | End: 2022-06-01

## 2022-06-01 RX ORDER — POTASSIUM CITRATE AND CITRIC ACID 334; 1100 MG/5ML; MG/5ML
1100-334 LIQUID ORAL 4 TIMES DAILY
Qty: 12 | Refills: 3 | Status: DISCONTINUED | COMMUNITY
Start: 2022-05-09 | End: 2022-06-01

## 2022-06-01 NOTE — HEALTH RISK ASSESSMENT
[Very Good] : ~his/her~ current health as very good [Excellent] : ~his/her~  mood as  excellent [Former] : Former [Yes] : Yes [No] : In the past 12 months have you used drugs other than those required for medical reasons? No [One fall no injury in past year] : Patient reported one fall in the past year without injury [Patient reported mammogram was normal] : Patient reported mammogram was normal [Patient reported colonoscopy was normal] : Patient reported colonoscopy was normal [HIV test declined] : HIV test declined [Hepatitis C test declined] : Hepatitis C test declined [None] : None [Alone] : lives alone [Retired] : retired [Single] : single [Feels Safe at Home] : Feels safe at home [Fully functional (bathing, dressing, toileting, transferring, walking, feeding)] : Fully functional (bathing, dressing, toileting, transferring, walking, feeding) [Fully functional (using the telephone, shopping, preparing meals, housekeeping, doing laundry, using] : Fully functional and needs no help or supervision to perform IADLs (using the telephone, shopping, preparing meals, housekeeping, doing laundry, using transportation, managing medications and managing finances) [Reports normal functional visual acuity (ie: able to read med bottle)] : Reports normal functional visual acuity [Smoke Detector] : smoke detector [Carbon Monoxide Detector] : carbon monoxide detector [Safety elements used in home] : safety elements used in home [Seat Belt] :  uses seat belt [Sunscreen] : uses sunscreen [With Patient/Caregiver] : , with patient/caregiver [Designated Healthcare Proxy] : Designated healthcare proxy [Name: ___] : Health Care Proxy's Name: [unfilled]  [Relationship: ___] : Relationship: [unfilled] [DNR] : DNR [DNI] : DNI [FreeTextEntry1] : none [de-identified] : none [de-identified] : Dr Hope (Uro), Dr Still (Derm) [YearQuit] : 1990 [de-identified] : see SBIRT, only drinks wine [de-identified] : stays active [de-identified] : regular [de-identified] : tripped, no injury [Change in mental status noted] : No change in mental status noted [Language] : denies difficulty with language [Behavior] : denies difficulty with behavior [Learning/Retaining New Information] : denies difficulty learning/retaining new information [Handling Complex Tasks] : denies difficulty handling complex tasks [Reasoning] : denies difficulty with reasoning [Spatial Ability and Orientation] : denies difficulty with spatial ability and orientation [Reports changes in hearing] : Reports no changes in hearing [Reports changes in vision] : Reports no changes in vision [Reports changes in dental health] : Reports no changes in dental health [Guns at Home] : no guns at home [Travel to Developing Areas] : does not  travel to developing areas [TB Exposure] : is not being exposed to tuberculosis [Caregiver Concerns] : does not have caregiver concerns [MammogramDate] : 09/21 [ColonoscopyDate] : 05/18 [AdvancecareDate] : 06/22

## 2022-06-01 NOTE — PLAN
[FreeTextEntry1] : labs were ordered to further assess her health, mammogram and breast US ordered, she will establish care with a new PCP in [place]

## 2022-06-02 DIAGNOSIS — D69.6 THROMBOCYTOPENIA, UNSPECIFIED: ICD-10-CM

## 2022-06-02 LAB
ALBUMIN SERPL ELPH-MCNC: 4.5 G/DL
ALP BLD-CCNC: 58 U/L
ALT SERPL-CCNC: 15 U/L
ANION GAP SERPL CALC-SCNC: 12 MMOL/L
AST SERPL-CCNC: 18 U/L
BASOPHILS # BLD AUTO: 0.06 K/UL
BASOPHILS NFR BLD AUTO: 0.8 %
BILIRUB SERPL-MCNC: 0.6 MG/DL
BUN SERPL-MCNC: 23 MG/DL
CALCIUM SERPL-MCNC: 9.8 MG/DL
CHLORIDE SERPL-SCNC: 106 MMOL/L
CHOLEST SERPL-MCNC: 201 MG/DL
CO2 SERPL-SCNC: 26 MMOL/L
CREAT SERPL-MCNC: 0.84 MG/DL
EGFR: 75 ML/MIN/1.73M2
EOSINOPHIL # BLD AUTO: 0.17 K/UL
EOSINOPHIL NFR BLD AUTO: 2.2 %
GLUCOSE SERPL-MCNC: 111 MG/DL
HCT VFR BLD CALC: 44.1 %
HDLC SERPL-MCNC: 74 MG/DL
HGB BLD-MCNC: 13.8 G/DL
IMM GRANULOCYTES NFR BLD AUTO: 0.1 %
LDLC SERPL CALC-MCNC: 111 MG/DL
LYMPHOCYTES # BLD AUTO: 1.89 K/UL
LYMPHOCYTES NFR BLD AUTO: 24.7 %
MAN DIFF?: NORMAL
MCHC RBC-ENTMCNC: 29.2 PG
MCHC RBC-ENTMCNC: 31.3 GM/DL
MCV RBC AUTO: 93.2 FL
MONOCYTES # BLD AUTO: 0.64 K/UL
MONOCYTES NFR BLD AUTO: 8.4 %
NEUTROPHILS # BLD AUTO: 4.88 K/UL
NEUTROPHILS NFR BLD AUTO: 63.8 %
NONHDLC SERPL-MCNC: 126 MG/DL
PLATELET # BLD AUTO: 119 K/UL
POTASSIUM SERPL-SCNC: 5 MMOL/L
PROT SERPL-MCNC: 7.3 G/DL
RBC # BLD: 4.73 M/UL
RBC # FLD: 14.1 %
SODIUM SERPL-SCNC: 144 MMOL/L
TRIGL SERPL-MCNC: 79 MG/DL
TSH SERPL-ACNC: 3.02 UIU/ML
WBC # FLD AUTO: 7.65 K/UL

## 2022-08-04 NOTE — ASU PATIENT PROFILE, ADULT - NSCAFFEINETYPE_GEN_ALL_CORE_SD
Caller: patient  Annual scheduled /birth control     Details of condition:   Patient stated she will run out of birth control before her next annual. Patient scheduled for 9/16 with [facility]. Patient previously seen Kena.     Pharmacy verified? Yes  Appointment offered? Yes  Best time to reach patient: any  Patient would like a call back at mobile   (enter if call back number is different from primary phone number)  Okay to leave a detailed voicemail? yes       coffee
